# Patient Record
Sex: FEMALE | Race: WHITE | Employment: STUDENT | URBAN - NONMETROPOLITAN AREA
[De-identification: names, ages, dates, MRNs, and addresses within clinical notes are randomized per-mention and may not be internally consistent; named-entity substitution may affect disease eponyms.]

---
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
## 2020-01-27 ENCOUNTER — MYC MEDICAL ADVICE (OUTPATIENT)
Dept: FAMILY MEDICINE | Facility: CLINIC | Age: 20
End: 2020-01-27

## 2020-01-27 DIAGNOSIS — N93.8 DUB (DYSFUNCTIONAL UTERINE BLEEDING): Primary | ICD-10-CM

## 2020-01-28 RX ORDER — ETONOGESTREL AND ETHINYL ESTRADIOL VAGINAL RING .015; .12 MG/D; MG/D
1 RING VAGINAL
Qty: 3 EACH | Refills: 3 | Status: SHIPPED | OUTPATIENT
Start: 2020-01-28 | End: 2020-05-04

## 2020-05-04 ENCOUNTER — MYC REFILL (OUTPATIENT)
Dept: FAMILY MEDICINE | Facility: CLINIC | Age: 20
End: 2020-05-04

## 2020-05-04 DIAGNOSIS — N93.8 DUB (DYSFUNCTIONAL UTERINE BLEEDING): ICD-10-CM

## 2020-05-04 RX ORDER — ETONOGESTREL AND ETHINYL ESTRADIOL VAGINAL RING .015; .12 MG/D; MG/D
1 RING VAGINAL
Qty: 3 EACH | Refills: 0 | Status: SHIPPED | OUTPATIENT
Start: 2020-05-04 | End: 2020-10-14

## 2020-05-04 RX ORDER — ETONOGESTREL AND ETHINYL ESTRADIOL VAGINAL RING .015; .12 MG/D; MG/D
1 RING VAGINAL
Qty: 3 EACH | Refills: 0 | Status: SHIPPED | OUTPATIENT
Start: 2020-05-04 | End: 2020-05-04

## 2020-07-10 ENCOUNTER — MYC REFILL (OUTPATIENT)
Dept: FAMILY MEDICINE | Facility: CLINIC | Age: 20
End: 2020-07-10

## 2020-07-10 DIAGNOSIS — N93.8 DUB (DYSFUNCTIONAL UTERINE BLEEDING): ICD-10-CM

## 2020-07-10 RX ORDER — ETONOGESTREL AND ETHINYL ESTRADIOL VAGINAL RING .015; .12 MG/D; MG/D
1 RING VAGINAL
Qty: 3 EACH | Refills: 0 | OUTPATIENT
Start: 2020-07-10

## 2020-10-12 ENCOUNTER — MYC REFILL (OUTPATIENT)
Dept: FAMILY MEDICINE | Facility: CLINIC | Age: 20
End: 2020-10-12

## 2020-10-12 DIAGNOSIS — N93.8 DUB (DYSFUNCTIONAL UTERINE BLEEDING): ICD-10-CM

## 2020-10-12 DIAGNOSIS — G43.109 MIGRAINE WITH AURA AND WITHOUT STATUS MIGRAINOSUS, NOT INTRACTABLE: ICD-10-CM

## 2020-10-12 RX ORDER — SUMATRIPTAN 20 MG/1
1 SPRAY NASAL PRN
Qty: 6 EACH | Refills: 5 | Status: CANCELLED | OUTPATIENT
Start: 2020-10-12

## 2020-10-12 RX ORDER — ETONOGESTREL AND ETHINYL ESTRADIOL VAGINAL RING .015; .12 MG/D; MG/D
1 RING VAGINAL
Qty: 3 EACH | Refills: 0 | Status: CANCELLED | OUTPATIENT
Start: 2020-10-12

## 2020-10-13 NOTE — TELEPHONE ENCOUNTER
"Requested Prescriptions   Pending Prescriptions Disp Refills     etonogestrel-ethinyl estradiol (NUVARING) 0.12-0.015 MG/24HR vaginal ring 3 each 0     Sig: Place 1 each vaginally every 28 days       Contraceptives Protocol Failed - 10/12/2020 10:32 AM        Failed - Recent (12 mo) or future (30 days) visit within the authorizing provider's specialty     Patient has had an office visit with the authorizing provider or a provider within the authorizing providers department within the previous 12 mos or has a future within next 30 days. See \"Patient Info\" tab in inbasket, or \"Choose Columns\" in Meds & Orders section of the refill encounter.              Passed - Patient is not a current smoker if age is 35 or older        Passed - Medication is active on med list        Passed - No active pregnancy on record        Passed - No positive pregnancy test in past 12 months             "

## 2020-10-13 NOTE — TELEPHONE ENCOUNTER
"Requested Prescriptions   Pending Prescriptions Disp Refills     SUMAtriptan (IMITREX) 20 MG/ACT nasal spray 6 each 5     Sig: Spray 1 spray in nostril as needed for migraine May repeat in 2 hours. Max 2 sprays/24 hours.       Serotonin Agonists Failed - 10/12/2020 10:38 AM        Failed - Blood pressure under 140/90 in past 12 months     BP Readings from Last 3 Encounters:   08/07/19 124/67   07/10/19 110/62   02/13/18 146/82 (>99 %, Z >2.33 /  93 %, Z = 1.48)*     *BP percentiles are based on the 2017 AAP Clinical Practice Guideline for girls                 Failed - Serotonin Agonist request needs review.     Please review patient's record. If patient has had 8 or more treatments in the past month, please forward to provider.          Failed - Recent (12 mo) or future (30 days) visit within the authorizing provider's specialty     Patient has had an office visit with the authorizing provider or a provider within the authorizing providers department within the previous 12 mos or has a future within next 30 days. See \"Patient Info\" tab in inbasket, or \"Choose Columns\" in Meds & Orders section of the refill encounter.              Passed - Medication is active on med list        Passed - Patient is age 18 or older        Passed - No active pregnancy on record        Passed - No positive pregnancy test in past 12 months             "

## 2020-10-14 ENCOUNTER — MYC MEDICAL ADVICE (OUTPATIENT)
Dept: FAMILY MEDICINE | Facility: CLINIC | Age: 20
End: 2020-10-14

## 2020-10-14 DIAGNOSIS — N93.8 DUB (DYSFUNCTIONAL UTERINE BLEEDING): ICD-10-CM

## 2020-10-14 RX ORDER — ETONOGESTREL AND ETHINYL ESTRADIOL VAGINAL RING .015; .12 MG/D; MG/D
1 RING VAGINAL
Qty: 3 EACH | Refills: 0 | Status: SHIPPED | OUTPATIENT
Start: 2020-10-14 | End: 2020-11-03

## 2020-10-14 NOTE — TELEPHONE ENCOUNTER
Call pt to set up virtuyal visit with ulises to get refills of migrain meds and ocp   May refill x1 to get to appt

## 2020-10-28 ENCOUNTER — HOSPITAL ENCOUNTER (OUTPATIENT)
Facility: CLINIC | Age: 20
Setting detail: OBSERVATION
Discharge: HOME | End: 2020-10-29
Attending: HOSPITALIST | Admitting: INTERNAL MEDICINE
Payer: COMMERCIAL

## 2020-10-28 ENCOUNTER — HOSPITAL ENCOUNTER (EMERGENCY)
Facility: CLINIC | Age: 20
Discharge: ANOTHER HOSPITAL | End: 2020-10-28
Attending: EMERGENCY MEDICINE | Admitting: EMERGENCY MEDICINE
Payer: COMMERCIAL

## 2020-10-28 ENCOUNTER — APPOINTMENT (OUTPATIENT)
Dept: GENERAL RADIOLOGY | Facility: CLINIC | Age: 20
End: 2020-10-28
Attending: EMERGENCY MEDICINE
Payer: COMMERCIAL

## 2020-10-28 VITALS
DIASTOLIC BLOOD PRESSURE: 69 MMHG | BODY MASS INDEX: 24.73 KG/M2 | HEART RATE: 76 BPM | OXYGEN SATURATION: 97 % | SYSTOLIC BLOOD PRESSURE: 124 MMHG | WEIGHT: 190 LBS | RESPIRATION RATE: 19 BRPM | TEMPERATURE: 97 F

## 2020-10-28 DIAGNOSIS — R79.89 ELEVATED TROPONIN: ICD-10-CM

## 2020-10-28 DIAGNOSIS — I40.0 SUBACUTE VIRAL MYOCARDITIS: Primary | ICD-10-CM

## 2020-10-28 PROBLEM — I51.4 MYOCARDITIS (H): Status: ACTIVE | Noted: 2020-10-28

## 2020-10-28 LAB
ANION GAP SERPL CALCULATED.3IONS-SCNC: 7 MMOL/L (ref 3–14)
BASOPHILS # BLD AUTO: 0 10E9/L (ref 0–0.2)
BASOPHILS NFR BLD AUTO: 0.5 %
BUN SERPL-MCNC: 10 MG/DL (ref 7–30)
CALCIUM SERPL-MCNC: 9.7 MG/DL (ref 8.5–10.1)
CHLORIDE SERPL-SCNC: 109 MMOL/L (ref 96–110)
CO2 SERPL-SCNC: 24 MMOL/L (ref 20–32)
CREAT SERPL-MCNC: 0.81 MG/DL (ref 0.5–1)
CRP SERPL-MCNC: 3.5 MG/L (ref 0–8)
D DIMER PPP FEU-MCNC: 0.4 UG/ML FEU (ref 0–0.5)
DIFFERENTIAL METHOD BLD: NORMAL
EOSINOPHIL # BLD AUTO: 0.2 10E9/L (ref 0–0.7)
EOSINOPHIL NFR BLD AUTO: 3.1 %
ERYTHROCYTE [DISTWIDTH] IN BLOOD BY AUTOMATED COUNT: 12.8 % (ref 10–15)
ERYTHROCYTE [SEDIMENTATION RATE] IN BLOOD BY WESTERGREN METHOD: 6 MM/H (ref 0–20)
GFR SERPL CREATININE-BSD FRML MDRD: >90 ML/MIN/{1.73_M2}
GLUCOSE SERPL-MCNC: 81 MG/DL (ref 70–99)
HCG UR QL: NEGATIVE
HCT VFR BLD AUTO: 44.2 % (ref 35–47)
HGB BLD-MCNC: 14.9 G/DL (ref 11.7–15.7)
IMM GRANULOCYTES # BLD: 0 10E9/L (ref 0–0.4)
IMM GRANULOCYTES NFR BLD: 0.2 %
LYMPHOCYTES # BLD AUTO: 1.7 10E9/L (ref 0.8–5.3)
LYMPHOCYTES NFR BLD AUTO: 28.7 %
MCH RBC QN AUTO: 30.1 PG (ref 26.5–33)
MCHC RBC AUTO-ENTMCNC: 33.7 G/DL (ref 31.5–36.5)
MCV RBC AUTO: 89 FL (ref 78–100)
MONOCYTES # BLD AUTO: 0.4 10E9/L (ref 0–1.3)
MONOCYTES NFR BLD AUTO: 7.7 %
NEUTROPHILS # BLD AUTO: 3.4 10E9/L (ref 1.6–8.3)
NEUTROPHILS NFR BLD AUTO: 59.8 %
NRBC # BLD AUTO: 0 10*3/UL
NRBC BLD AUTO-RTO: 0 /100
NT-PROBNP SERPL-MCNC: 48 PG/ML (ref 0–450)
PLATELET # BLD AUTO: 247 10E9/L (ref 150–450)
POTASSIUM SERPL-SCNC: 3.7 MMOL/L (ref 3.4–5.3)
RBC # BLD AUTO: 4.95 10E12/L (ref 3.8–5.2)
SARS-COV-2 RNA SPEC QL NAA+PROBE: NORMAL
SODIUM SERPL-SCNC: 140 MMOL/L (ref 133–144)
SPECIMEN SOURCE: NORMAL
TROPONIN I SERPL-MCNC: 0.06 UG/L (ref 0–0.04)
TROPONIN I SERPL-MCNC: 0.07 UG/L (ref 0–0.04)
TSH SERPL DL<=0.005 MIU/L-ACNC: 2.39 MU/L (ref 0.4–4)
WBC # BLD AUTO: 5.7 10E9/L (ref 4–11)

## 2020-10-28 PROCEDURE — 84443 ASSAY THYROID STIM HORMONE: CPT | Performed by: EMERGENCY MEDICINE

## 2020-10-28 PROCEDURE — 86140 C-REACTIVE PROTEIN: CPT | Performed by: EMERGENCY MEDICINE

## 2020-10-28 PROCEDURE — 93308 TTE F-UP OR LMTD: CPT | Performed by: EMERGENCY MEDICINE

## 2020-10-28 PROCEDURE — 71045 X-RAY EXAM CHEST 1 VIEW: CPT

## 2020-10-28 PROCEDURE — C9803 HOPD COVID-19 SPEC COLLECT: HCPCS | Performed by: EMERGENCY MEDICINE

## 2020-10-28 PROCEDURE — 84484 ASSAY OF TROPONIN QUANT: CPT | Performed by: EMERGENCY MEDICINE

## 2020-10-28 PROCEDURE — 99220 PR INITIAL OBSERVATION CARE,LEVEL III: CPT | Performed by: INTERNAL MEDICINE

## 2020-10-28 PROCEDURE — 99285 EMERGENCY DEPT VISIT HI MDM: CPT | Mod: 25 | Performed by: EMERGENCY MEDICINE

## 2020-10-28 PROCEDURE — 93010 ELECTROCARDIOGRAM REPORT: CPT | Performed by: EMERGENCY MEDICINE

## 2020-10-28 PROCEDURE — 93005 ELECTROCARDIOGRAM TRACING: CPT | Performed by: EMERGENCY MEDICINE

## 2020-10-28 PROCEDURE — 81025 URINE PREGNANCY TEST: CPT | Performed by: EMERGENCY MEDICINE

## 2020-10-28 PROCEDURE — 83880 ASSAY OF NATRIURETIC PEPTIDE: CPT | Performed by: EMERGENCY MEDICINE

## 2020-10-28 PROCEDURE — 85025 COMPLETE CBC W/AUTO DIFF WBC: CPT | Performed by: EMERGENCY MEDICINE

## 2020-10-28 PROCEDURE — 250N000013 HC RX MED GY IP 250 OP 250 PS 637: Performed by: EMERGENCY MEDICINE

## 2020-10-28 PROCEDURE — U0003 INFECTIOUS AGENT DETECTION BY NUCLEIC ACID (DNA OR RNA); SEVERE ACUTE RESPIRATORY SYNDROME CORONAVIRUS 2 (SARS-COV-2) (CORONAVIRUS DISEASE [COVID-19]), AMPLIFIED PROBE TECHNIQUE, MAKING USE OF HIGH THROUGHPUT TECHNOLOGIES AS DESCRIBED BY CMS-2020-01-R: HCPCS | Performed by: EMERGENCY MEDICINE

## 2020-10-28 PROCEDURE — 93308 TTE F-UP OR LMTD: CPT | Mod: 26 | Performed by: EMERGENCY MEDICINE

## 2020-10-28 PROCEDURE — 85652 RBC SED RATE AUTOMATED: CPT | Performed by: EMERGENCY MEDICINE

## 2020-10-28 PROCEDURE — 80048 BASIC METABOLIC PNL TOTAL CA: CPT | Performed by: EMERGENCY MEDICINE

## 2020-10-28 PROCEDURE — 85379 FIBRIN DEGRADATION QUANT: CPT | Performed by: EMERGENCY MEDICINE

## 2020-10-28 RX ORDER — ASPIRIN 81 MG/1
324 TABLET, CHEWABLE ORAL ONCE
Status: COMPLETED | OUTPATIENT
Start: 2020-10-28 | End: 2020-10-28

## 2020-10-28 RX ADMIN — ASPIRIN 81 MG 324 MG: 81 TABLET ORAL at 18:40

## 2020-10-28 ASSESSMENT — MIFFLIN-ST. JEOR: SCORE: 1780.58

## 2020-10-28 NOTE — ED PROVIDER NOTES
History     Chief Complaint   Patient presents with     Chest Pain     woke with chest pressure     HPI  Ricardo Antunez is a 19 year old female who presents for chest pain.  The patient says that she has had some mild left-sided chest pain over the past 1.5 weeks, described as mild pressure.  Then about 12 hours prior to her arrival here she was laying in bed when she developed increased pressure that radiated to both sides of her chest.  She felt somewhat diaphoretic and short of breath with this, lasted for about 15 minutes and then it resolved.  She has not taking medicine for this.  She still has some mild pressure for the left side of her chest.  She denies any fevers, chills, headache, shortness of breath, cough, hemoptysis, runny nose, sore throat, ear pain, abdominal pain, vomiting, diarrhea, or rash.  No history of blood clots.  She denies any pain swelling of the lower extremities or recent immobilization.  She is not on oral birth control.  No personal family history of blood clots.  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    Allergies:  Allergies   Allergen Reactions     Cats        Problem List:    Patient Active Problem List    Diagnosis Date Noted     Migraine with aura 12/01/2013     Priority: Medium     Acute suppurative otitis media without spontaneous rupture of ear drum 03/22/2006     Priority: Medium     Problem list name updated by automated process. Provider to review          Past Medical History:    No past medical history on file.    Past Surgical History:    No past surgical history on file.    Family History:    Family History   Problem Relation Age of Onset     Cancer Maternal Grandfather         leukemia       Social History:  Marital Status:  Single [1]  Social History     Tobacco Use     Smoking status: Never Smoker     Smokeless tobacco: Never Used   Substance Use Topics     Alcohol use: No     Drug use: No         Medications:         acetaminophen (TYLENOL) 325 MG tablet       etonogestrel-ethinyl estradiol (NUVARING) 0.12-0.015 MG/24HR vaginal ring       etonogestrel-ethinyl estradiol (NUVARING) 0.12-0.015 MG/24HR vaginal ring       ondansetron (ZOFRAN) 4 MG tablet       SUMAtriptan (IMITREX) 20 MG/ACT nasal spray       SUMAtriptan (IMITREX) 50 MG tablet          Review of Systems  Pertinent positives and negatives listed in the HPI, all other systems reviewed and are negative.    Physical Exam   BP: (!) 153/89  Pulse: 56  Temp: 97  F (36.1  C)  Resp: 16  Weight: 86.2 kg (190 lb)  SpO2: 97 %      Physical Exam  Vitals signs and nursing note reviewed.   Constitutional:       General: She is in acute distress.      Appearance: She is well-developed. She is not diaphoretic.   HENT:      Head: Normocephalic and atraumatic.      Right Ear: External ear normal.      Left Ear: External ear normal.      Nose: Nose normal.   Eyes:      General: No scleral icterus.     Conjunctiva/sclera: Conjunctivae normal.   Neck:      Musculoskeletal: Normal range of motion.   Cardiovascular:      Rate and Rhythm: Normal rate and regular rhythm.   Pulmonary:      Effort: Pulmonary effort is normal. No respiratory distress.      Breath sounds: No stridor.   Abdominal:      General: There is no distension.      Palpations: Abdomen is soft.      Tenderness: There is no abdominal tenderness. There is no guarding or rebound.   Musculoskeletal:      Right lower leg: No edema.      Left lower leg: No edema.   Skin:     General: Skin is warm and dry.   Neurological:      Mental Status: She is alert and oriented to person, place, and time.   Psychiatric:         Behavior: Behavior normal.         ED Course        Procedures    Results for orders placed during the hospital encounter of 10/28/20   POC US ECHO LIMITED    [facility] Procedure Note      Limited Bedside ED Cardiac Ultrasound:    PROCEDURE: PERFORMED BY: Dr. Andre Gould  MD Jewels  INDICATIONS/SYMPTOM:  Chest Pain  PROBE: Cardiac phased array probe  BODY LOCATION: Chest  FINDINGS:   The ultrasound was performed utilizing the parasternal long axis, parasternal short axis and apical 4 chamber views.  Cardiac contractility:  Present  Gross estimation of cardiac kinesis: normal  Pericardial Effusion:  None  RV:LV ratio: LV > RV  INTERPRETATION:    Chamber size and motion were grossly normal with LV > RV, normal cardiac kinesis.  No pericardial effusion was found.  IMAGE DOCUMENTATION: Images were archived to PACs system.                  EKG Interpretation:      Interpreted by Andre Donis MD  Time reviewed: 1622  Symptoms at time of EKG: Chest pain   Rhythm: normal sinus   Rate: normal  Axis: normal  Ectopy: none  Conduction: normal  ST Segments/ T Waves: No ST-T wave changes  Q Waves: none  Comparison to prior: No old EKG available    Clinical Impression: normal EKG    Critical Care time:  none               Results for orders placed or performed during the hospital encounter of 10/28/20 (from the past 24 hour(s))   HCG qualitative urine   Result Value Ref Range    HCG Qual Urine Negative NEG^Negative   Basic metabolic panel   Result Value Ref Range    Sodium 140 133 - 144 mmol/L    Potassium 3.7 3.4 - 5.3 mmol/L    Chloride 109 96 - 110 mmol/L    Carbon Dioxide 24 20 - 32 mmol/L    Anion Gap 7 3 - 14 mmol/L    Glucose 81 70 - 99 mg/dL    Urea Nitrogen 10 7 - 30 mg/dL    Creatinine 0.81 0.50 - 1.00 mg/dL    GFR Estimate >90 >60 mL/min/[1.73_m2]    GFR Estimate If Black >90 >60 mL/min/[1.73_m2]    Calcium 9.7 8.5 - 10.1 mg/dL   CBC with platelets differential   Result Value Ref Range    WBC 5.7 4.0 - 11.0 10e9/L    RBC Count 4.95 3.8 - 5.2 10e12/L    Hemoglobin 14.9 11.7 - 15.7 g/dL    Hematocrit 44.2 35.0 - 47.0 %    MCV 89 78 - 100 fl    MCH 30.1 26.5 - 33.0 pg    MCHC 33.7 31.5 - 36.5 g/dL    RDW 12.8 10.0 - 15.0 %    Platelet Count 247 150 - 450 10e9/L    Diff Method  Automated Method     % Neutrophils 59.8 %    % Lymphocytes 28.7 %    % Monocytes 7.7 %    % Eosinophils 3.1 %    % Basophils 0.5 %    % Immature Granulocytes 0.2 %    Nucleated RBCs 0 0 /100    Absolute Neutrophil 3.4 1.6 - 8.3 10e9/L    Absolute Lymphocytes 1.7 0.8 - 5.3 10e9/L    Absolute Monocytes 0.4 0.0 - 1.3 10e9/L    Absolute Eosinophils 0.2 0.0 - 0.7 10e9/L    Absolute Basophils 0.0 0.0 - 0.2 10e9/L    Abs Immature Granulocytes 0.0 0 - 0.4 10e9/L    Absolute Nucleated RBC 0.0    Troponin I   Result Value Ref Range    Troponin I ES 0.063 (H) 0.000 - 0.045 ug/L   TSH with free T4 reflex   Result Value Ref Range    TSH 2.39 0.40 - 4.00 mU/L   D dimer quantitative   Result Value Ref Range    D Dimer 0.4 0.0 - 0.50 ug/ml FEU   Erythrocyte sedimentation rate auto   Result Value Ref Range    Sed Rate 6 0 - 20 mm/h   CRP inflammation   Result Value Ref Range    CRP Inflammation 3.5 0.0 - 8.0 mg/L   Nt probnp inpatient   Result Value Ref Range    N-Terminal Pro BNP Inpatient 48 0 - 450 pg/mL   POC US ECHO LIMITED    Impression    [facility] Procedure Note      Limited Bedside ED Cardiac Ultrasound:    PROCEDURE: PERFORMED BY: Dr. Andre Donis MD  INDICATIONS/SYMPTOM:  Chest Pain  PROBE: Cardiac phased array probe  BODY LOCATION: Chest  FINDINGS:   The ultrasound was performed utilizing the parasternal long axis, parasternal short axis and apical 4 chamber views.  Cardiac contractility:  Present  Gross estimation of cardiac kinesis: normal  Pericardial Effusion:  None  RV:LV ratio: LV > RV  INTERPRETATION:    Chamber size and motion were grossly normal with LV > RV, normal cardiac kinesis.  No pericardial effusion was found.  IMAGE DOCUMENTATION: Images were archived to PACs system.        XR Chest Port 1 View    Narrative    CHEST PORTABLE ONE VIEW   10/28/2020 5:50 PM     HISTORY: Chest pain.    COMPARISON: None available.      Impression    IMPRESSION: Single portable AP view of the chest was  obtained.  Cardiomediastinal silhouette is within normal limits. No suspicious  focal pulmonary opacities. No significant pleural effusion or  pneumothorax.    LUIS MIGUEL WILKERSON MD       Medications   aspirin (ASA) chewable tablet 324 mg (324 mg Oral Given 10/28/20 1840)       Assessments & Plan (with Medical Decision Making)   19-year-old female who presents for chest pain.  Blood pressure is 129/75, temperature is 97  F, heart 58, SPO2 is 100% on room air.  Urine pregnancy test is negative.  I think that this patient is very low risk for pulmonary embolism and no further work-up is pursued.  EKG is sinus rhythm without signs of ischemia or dysrhythmia.  Troponin is mildly elevated at 0.063 at bedside ultrasound shows good cardiac function with no pericardial effusion, no dilated right ventricle, no signs of hypertrophic cardiomyopathy.  She is given aspirin.    I discussed the case with Dr. Ji, the on-call cardiologist at [facility].  He recommended testing to D-dimer, ESR, and CRP and recommended transferring her to [facility] for close monitoring.    We discussed heparin and he said if she was pain-free and her troponin was not rising he would hold off on heparin for now.    I discussed the case with the on-call hospitalist at [facility], Dr. Stearns, who agrees to accept the patient to his service.  D-dimer is normal, unlikely pulmonary embolism.  ESR and CRP are normal which is reassuring.  Hemoglobin is normal, no signs of anemia.  Electrolytes are within normal limits.  Unclear cause of the patient's elevated troponin.  A repeat troponin is now pending and the patient will be transferred to [facility] for further treatment.  The patient is in agreement with this plan.    I have reviewed the nursing notes.    I have reviewed the findings, diagnosis, plan and need for follow up with the patient.       New Prescriptions    No medications on file       Final diagnoses:   Elevated troponin        10/28/2020   [facility] EMERGENCY DEPT     Andre Donis MD  10/28/20 [id number]

## 2020-10-28 NOTE — LETTER
[facility] [address] [facility]  [address] [place] [address]  Phone: [phone]    October 29, 2020        Ricardo Antunez  [address]   [address]  [place] [address]      To whom it may concern:    RE: Ricardo Silva was hospitalized from 10/28 to 10/29 and may return to school on Monday, 11/2. She will have medical follow up appointments in the upcoming weeks.       Sincerely,        Melissa Cho

## 2020-10-28 NOTE — ED NOTES
"Pt presents to ED with c/o chest pain. Chest pain described as \"discomfort\" and \"pressure\" Symptoms onset was ~1 week ago. Last night woke up @ 3 am and CP associated with lightheadedness and dizziness. Denies N/V/D. Denies SOB.   "

## 2020-10-29 ENCOUNTER — APPOINTMENT (OUTPATIENT)
Dept: CARDIOLOGY | Facility: CLINIC | Age: 20
End: 2020-10-29
Attending: INTERNAL MEDICINE
Payer: COMMERCIAL

## 2020-10-29 VITALS
SYSTOLIC BLOOD PRESSURE: 116 MMHG | TEMPERATURE: 98.1 F | RESPIRATION RATE: 20 BRPM | BODY MASS INDEX: 25.73 KG/M2 | HEIGHT: 72 IN | WEIGHT: 190 LBS | OXYGEN SATURATION: 97 % | DIASTOLIC BLOOD PRESSURE: 72 MMHG | HEART RATE: 82 BPM

## 2020-10-29 LAB
CHOLEST SERPL-MCNC: 214 MG/DL
HDLC SERPL-MCNC: 68 MG/DL
LABORATORY COMMENT REPORT: NORMAL
LDLC SERPL CALC-MCNC: 116 MG/DL
NONHDLC SERPL-MCNC: 146 MG/DL
SARS-COV-2 RNA SPEC QL NAA+PROBE: NEGATIVE
SPECIMEN SOURCE: NORMAL
TRIGL SERPL-MCNC: 149 MG/DL
TROPONIN I SERPL-MCNC: 0.06 UG/L (ref 0–0.04)
TROPONIN I SERPL-MCNC: 0.07 UG/L (ref 0–0.04)

## 2020-10-29 PROCEDURE — 250N000013 HC RX MED GY IP 250 OP 250 PS 637: Performed by: INTERNAL MEDICINE

## 2020-10-29 PROCEDURE — 99204 OFFICE O/P NEW MOD 45 MIN: CPT | Mod: 25 | Performed by: INTERNAL MEDICINE

## 2020-10-29 PROCEDURE — 93306 TTE W/DOPPLER COMPLETE: CPT | Mod: 26 | Performed by: INTERNAL MEDICINE

## 2020-10-29 PROCEDURE — 93306 TTE W/DOPPLER COMPLETE: CPT

## 2020-10-29 PROCEDURE — 99217 PR OBSERVATION CARE DISCHARGE: CPT | Performed by: INTERNAL MEDICINE

## 2020-10-29 PROCEDURE — 84484 ASSAY OF TROPONIN QUANT: CPT | Performed by: INTERNAL MEDICINE

## 2020-10-29 PROCEDURE — 36415 COLL VENOUS BLD VENIPUNCTURE: CPT | Performed by: INTERNAL MEDICINE

## 2020-10-29 PROCEDURE — 80061 LIPID PANEL: CPT | Performed by: INTERNAL MEDICINE

## 2020-10-29 PROCEDURE — G0378 HOSPITAL OBSERVATION PER HR: HCPCS

## 2020-10-29 PROCEDURE — 99207 PR CDG-CODE CATEGORY CHANGED: CPT | Performed by: INTERNAL MEDICINE

## 2020-10-29 RX ORDER — COLCHICINE 0.6 MG/1
0.6 TABLET ORAL 2 TIMES DAILY
Qty: 60 TABLET | Refills: 0 | Status: SHIPPED | OUTPATIENT
Start: 2020-10-29 | End: 2020-11-30

## 2020-10-29 RX ORDER — COLCHICINE 0.6 MG/1
0.6 TABLET ORAL 2 TIMES DAILY
Status: DISCONTINUED | OUTPATIENT
Start: 2020-10-29 | End: 2020-10-29 | Stop reason: HOSPADM

## 2020-10-29 RX ORDER — AMOXICILLIN 250 MG
1 CAPSULE ORAL 2 TIMES DAILY PRN
Status: DISCONTINUED | OUTPATIENT
Start: 2020-10-29 | End: 2020-10-29 | Stop reason: HOSPADM

## 2020-10-29 RX ORDER — ACETAMINOPHEN 325 MG/1
650 TABLET ORAL EVERY 4 HOURS PRN
Status: DISCONTINUED | OUTPATIENT
Start: 2020-10-29 | End: 2020-10-29 | Stop reason: HOSPADM

## 2020-10-29 RX ORDER — OXYCODONE HYDROCHLORIDE 5 MG/1
5-10 TABLET ORAL
Status: DISCONTINUED | OUTPATIENT
Start: 2020-10-29 | End: 2020-10-29 | Stop reason: HOSPADM

## 2020-10-29 RX ORDER — LANOLIN ALCOHOL/MO/W.PET/CERES
3 CREAM (GRAM) TOPICAL
Status: DISCONTINUED | OUTPATIENT
Start: 2020-10-29 | End: 2020-10-29 | Stop reason: HOSPADM

## 2020-10-29 RX ORDER — PROCHLORPERAZINE 25 MG
25 SUPPOSITORY, RECTAL RECTAL EVERY 12 HOURS PRN
Status: DISCONTINUED | OUTPATIENT
Start: 2020-10-29 | End: 2020-10-29 | Stop reason: HOSPADM

## 2020-10-29 RX ORDER — ONDANSETRON 4 MG/1
4 TABLET, ORALLY DISINTEGRATING ORAL EVERY 6 HOURS PRN
Status: DISCONTINUED | OUTPATIENT
Start: 2020-10-29 | End: 2020-10-29 | Stop reason: HOSPADM

## 2020-10-29 RX ORDER — POLYETHYLENE GLYCOL 3350 17 G/17G
17 POWDER, FOR SOLUTION ORAL DAILY PRN
Status: DISCONTINUED | OUTPATIENT
Start: 2020-10-29 | End: 2020-10-29 | Stop reason: HOSPADM

## 2020-10-29 RX ORDER — ACETAMINOPHEN 650 MG/1
650 SUPPOSITORY RECTAL EVERY 4 HOURS PRN
Status: DISCONTINUED | OUTPATIENT
Start: 2020-10-29 | End: 2020-10-29 | Stop reason: HOSPADM

## 2020-10-29 RX ORDER — LIDOCAINE 40 MG/G
CREAM TOPICAL
Status: DISCONTINUED | OUTPATIENT
Start: 2020-10-29 | End: 2020-10-29 | Stop reason: HOSPADM

## 2020-10-29 RX ORDER — ONDANSETRON 2 MG/ML
4 INJECTION INTRAMUSCULAR; INTRAVENOUS EVERY 6 HOURS PRN
Status: DISCONTINUED | OUTPATIENT
Start: 2020-10-29 | End: 2020-10-29 | Stop reason: HOSPADM

## 2020-10-29 RX ORDER — BISACODYL 10 MG
10 SUPPOSITORY, RECTAL RECTAL DAILY PRN
Status: DISCONTINUED | OUTPATIENT
Start: 2020-10-29 | End: 2020-10-29 | Stop reason: HOSPADM

## 2020-10-29 RX ORDER — PROCHLORPERAZINE MALEATE 5 MG
10 TABLET ORAL EVERY 6 HOURS PRN
Status: DISCONTINUED | OUTPATIENT
Start: 2020-10-29 | End: 2020-10-29 | Stop reason: HOSPADM

## 2020-10-29 RX ORDER — NALOXONE HYDROCHLORIDE 0.4 MG/ML
.1-.4 INJECTION, SOLUTION INTRAMUSCULAR; INTRAVENOUS; SUBCUTANEOUS
Status: DISCONTINUED | OUTPATIENT
Start: 2020-10-29 | End: 2020-10-29 | Stop reason: HOSPADM

## 2020-10-29 RX ORDER — AMOXICILLIN 250 MG
2 CAPSULE ORAL 2 TIMES DAILY PRN
Status: DISCONTINUED | OUTPATIENT
Start: 2020-10-29 | End: 2020-10-29 | Stop reason: HOSPADM

## 2020-10-29 RX ADMIN — COLCHICINE 0.6 MG: 0.6 TABLET, FILM COATED ORAL at 18:18

## 2020-10-29 NOTE — CONSULTS
CARDIOLOGY CONSULT    REASON FOR CONSULT: myopericarditis    PRIMARY CARE PHYSICIAN:  Nupur Sanchez    HISTORY OF PRESENT ILLNESS:  Ms. Antunez is a 19 year old woman without significant PMH who presents for the evaluation of chest pain.   Ricardo states that two weeks ago she noted anterior chest discomfort.  This was fairly persistent over two weeks.  She does not notice any change with inspiration or change with change with position.  She had been in contact with a friend who tested positive for COVID 19 and had noted a mild cough several days after seeing her.  No other fevers/chills/shortness of breath. She plays volleyball for [employer] and noted some light-headedness the other day at practice.   As her symptoms persisted she went to the ED and was noted to have a modestly elevated troponin.  She was transferred to [facility].  Echocardiogram demonstrated normal LV function, small pericardial effusion noted along with RV free wall.  CRP normal.    She is currently chest pain free.  She denies any exertional chest pain, chest discomfort or shortness of breath.   She is otherwise without complaints.      PAST MEDICAL HISTORY:  No significant PMH      MEDICATIONS:  Current Facility-Administered Medications   Medication     acetaminophen (TYLENOL) Suppository 650 mg     acetaminophen (TYLENOL) tablet 650 mg     bisacodyl (DULCOLAX) Suppository 10 mg     lidocaine (LMX4) cream     lidocaine 1 % 0.1-1 mL     melatonin tablet 3 mg     naloxone (NARCAN) injection 0.1-0.4 mg     ondansetron (ZOFRAN-ODT) ODT tab 4 mg    Or     ondansetron (ZOFRAN) injection 4 mg     oxyCODONE (ROXICODONE) tablet 5-10 mg     polyethylene glycol (MIRALAX) Packet 17 g     prochlorperazine (COMPAZINE) injection 10 mg    Or     prochlorperazine (COMPAZINE) tablet 10 mg    Or     prochlorperazine (COMPAZINE) suppository 25 mg     senna-docusate (SENOKOT-S/PERICOLACE) 8.6-50 MG per tablet 1 tablet    Or     senna-docusate  (SENOKOT-S/PERICOLACE) 8.6-50 MG per tablet 2 tablet     sodium chloride (PF) 0.9% PF flush 3 mL     sodium chloride (PF) 0.9% PF flush 3 mL       ALLERGIES:  Allergies   Allergen Reactions     Cats        SOCIAL HISTORY:  I have reviewed this patient's social history and updated it with pertinent information if needed. Ricardo Antunez  reports that she has never smoked. She has never used smokeless tobacco. She reports that she does not drink alcohol or use drugs.    FAMILY HISTORY:  I have reviewed this patient's family history and updated it with pertinent information if needed.   Family History   Problem Relation Age of Onset     Cancer Maternal Grandfather         leukemia       REVIEW OF SYSTEMS:  A complete ROS was obtained and the pertinent positives are outlined in the history of present illness above.  The remainder of systems is negative.      PHYSICAL EXAM:  Temp: 98.1  F (36.7  C) Temp src: Oral BP: 115/71 Pulse: 77   Resp: 20 SpO2: 98 % O2 Device: None (Room air)    Vital Signs with Ranges  Temp:  [97  F (36.1  C)-98.9  F (37.2  C)] 98.1  F (36.7  C)  Pulse:  [56-92] 77  Resp:  [14-30] 20  BP: (115-153)/(68-89) 115/71  SpO2:  [97 %-100 %] 98 %  190 lbs 0 oz    Constitutional: awake, alert, no distress  Eyes: PERRL, sclera nonicteric  ENT: trachea midline  Respiratory: CTAB  Cardiovascular: RRR no m/r/g, no JVD  GI: nondistended, nontender, bowel sounds present  Lymph/Hematologic: no lymphadenopathy  Skin: dry, no rash  Musculoskeletal: good muscle tone, no edema bilaterally  Neurologic: no focal deficits  Neuropsychiatric: appropriate affact    DATA:  Labs:    Reviewed in Epic.   Troponins 0.065-0.066-0.063    ECG:  Dated 10/28/2020 reviewed personally.  Normal sinus rhythm with nonspecific ST segment changes    Echocardiogram:  Interpretation Summary  1. Left ventricular systolic function is normal. The visual ejection fraction  is estimated at 60-65%.  2. No regional wall motion abnormalities  noted.  3. The right ventricle is normal in structure, function and size.  4. No evidence for significant valvular pathology.  5. Small pericardial effusion adjacent to RV free wall. No tamponade  physiology.        ASSESSMENT:  1.  Probable myopericarditis:  Modestly elevated troponin, flat.  Chest pain now resolved.    2.  Small pericardial effusion:  Secondary to #1  3.  COVID exposure:  Negative COVID test x2    RECOMMENDATIONS:  1. Reviewed results of her echocardiogram, lab findings with Ricardo and her mother (over the phone).  Presentation consistent with myopericarditis.  COVID tests have been negative.  She is currently chest pain free.  Plan as follows:  -Cardiac MRI (either inpatient tomorrow, our as outpatient)  -Colchicine 0.6 mg PO BID x3 months  -Will defer NSAIDS as now symptom free  -Given diagnosis of myopericarditis she has been instructed to refrain from strenuous activity for 6 months (which includes collegiate volleyball)    Kelly Burr MD  Cardiology - [facility]  Pager:  [phone]  October 29, 2020

## 2020-10-29 NOTE — PLAN OF CARE
DATE & TIME: 10/29/2020 4148-4053   Cognitive Concerns/ Orientation : a&ox4   BEHAVIOR & AGGRESSION TOOL COLOR: green  CIWA SCORE: n/a   ABNL VS/O2: WNL  MOBILITY: Ind  PAIN MANAGMENT: denies  DIET: no-caffeine  BOWEL/BLADDER: continent  ABNL LAB/BG: Troponin 0.066, 0.063  DRAIN/DEVICES: PIV SL  TELEMETRY RHYTHM: NSR  SKIN: WDL.  TESTS/PROCEDURES: EKG, cardiology consult  D/C DAY/GOALS/PLACE: pending tests, consult and COVID result.  OTHER IMPORTANT INFO:

## 2020-10-29 NOTE — PLAN OF CARE
Obs goals  -diagnostic tests and consults completed and resulted- Not Met   -vital signs normal or at patient baseline- Met  Nurse to notify provider when observation goals have been met and patient is ready for discharge.

## 2020-10-29 NOTE — PHARMACY-ADMISSION MEDICATION HISTORY
Pharmacy Medication History  Admission medication history interview status for the 10/28/2020  admission is complete. See EPIC admission navigator for prior to admission medications       Medication history sources: Patient  Location of interview: Phone  Medication history source reliability: Good  Adherence assessment: Good    Medication reconciliation completed by provider prior to medication history? No    Time spent in this activity: 10 minutes      Prior to Admission medications    Medication Sig Last Dose Taking? Auth Provider   acetaminophen (TYLENOL) 325 MG tablet Take 325-650 mg by mouth every 6 hours as needed Past Week at Unknown time Yes Reported, Patient   etonogestrel-ethinyl estradiol (NUVARING) 0.12-0.015 MG/24HR vaginal ring Place 1 each vaginally every 28 days Past Month at Unknown time Yes Nupur Sanchez APRN CNP   SUMAtriptan (IMITREX) 20 MG/ACT nasal spray Spray 1 spray in nostril as needed for migraine May repeat in 2 hours. Max 2 sprays/24 hours.  at New, has not started. Yes Nupur Sanchez APRN CNP   SUMAtriptan (IMITREX) 50 MG tablet Take 1 tablet (50 mg) by mouth at onset of headache for migraine May repeat in 2 hours. Max 4 tablets/24 hours.  at Never used Yes Nupur Sanchez APRN CNP   ondansetron (ZOFRAN) 4 MG tablet Take 2 tablets (8 mg) by mouth every 6 hours as needed for nausea More than a month at Unknown time  Nupur Sanchez APRN CNP Sharon Chandler, PharmD, BCPS

## 2020-10-29 NOTE — DISCHARGE SUMMARY
[facility]  Hospitalist Discharge Summary      Date of Admission:  10/28/2020  Date of Discharge:  10/29/2020  Discharging Provider: Melissa Cho MD      Discharge Diagnoses   Probable myopericarditis   Small pericardial effusion:  Secondary to #1  COVID exposure:  Negative COVID test x2    Follow-ups Needed After Discharge   Follow-up Appointments     Follow-up and recommended labs and tests       Follow up with cardiology with MRI as they have scheduled.             Unresulted Labs Ordered in the Past 30 Days of this Admission     No orders found for last 31 day(s).          Discharge Disposition   Discharged to home  Condition at discharge: Good      Hospital Course   Ricardo Antunez is a 19 year old female admitted on 10/28/2020. She presented as a direct admission from [facility] emergency department in the setting of mildly elevated troponin and anterior chest discomfort, some mild URI symptoms in the preceding week. She had a recent mild cough which has since resolved in the preceding 1 to 2 weeks, and she was exposed to someone who tested positive for Covid. No pleuritic component to chest discomfort.     Probable myocarditis  Chest pain with mild troponin elevation secondary to above   Small pericardial effusion secondary to above.   Covid negative x 2   * Her troponin was ~ 0.06 and remained stable  * D-dimer negative. BNP normal.   * Echo showed nl EF 60-65%, no regional WMAs. Nl RV size and function.  No evidence of significant valve pathology.  Small pericardial effusion.     - Chest pain resolved.   - Cardiology consulted and recommended the following:   - Cardiac MRI scheduled as outpatient next week.   - Started colchicine 0.6 mg PO BID x 3 months  - We did not start NSAIDS as chest pain resolved.   - Given diagnosis of myopericarditis she has been instructed to refrain from strenuous activity for 6 months (which includes collegiate volleyball)    Consultations This  Hospital Stay   CARDIOLOGY IP CONSULT    Code Status   Full Code    Time Spent on this Encounter   I, Melissa Cho MD, personally saw the patient today and spent greater than 30 minutes discharging this patient.       Melissa Cho MD  [place] [address] [facility]  [address] [place] [address]  Phone: [phone]  ______________________________________________________________________    Physical Exam   Vital Signs: Temp: 98.1  F (36.7  C) Temp src: Oral BP: 115/71 Pulse: 77   Resp: 20 SpO2: 98 % O2 Device: None (Room air)    Weight: 190 lbs 0 oz  Constitutional:  NAD,   Neuropsyche:  alert and oriented, answers questions appropriately.   Respiratory:  Breathing comfortably, good air exchange, no wheezes, no crackles.   Cardiovascular:  Regular rate and rhythm, no edema.  GI:  soft, NT/ND, BS normal  Skin/Integumen:  No acute rash, bruising or sign of bleeding.          Primary Care Physician   Nupur Sanchez    Discharge Orders      MRI Cardiac w/contrast     Lipid Profile     Discharge Order: F/U with Cardiac  ZLULY      Reason for your hospital stay    You were admitted with chest pain likely due to inflammation of the heart muscle. This is likely due to a recent viral infection. You have had 2 negative PCR Covid tests, which makes Covid a less likely cause.     Follow-up and recommended labs and tests     Follow up with cardiology with MRI as they have scheduled.     Activity    Your activity upon discharge:Refrain from strenuous activity for 6 months (which includes collegiate volleyball)     Diet    Regular diet       Significant Results and Procedures   Most Recent 3 CBC's:  Recent Labs   Lab Test 10/28/20  1706   WBC 5.7   HGB 14.9   MCV 89        Most Recent 3 BMP's:  Recent Labs   Lab Test 10/28/20  1706      POTASSIUM 3.7   CHLORIDE 109   CO2 24   BUN 10   CR 0.81   ANIONGAP 7   JEREMIAH 9.7   GLC 81     Most Recent 2 LFT's:No lab results found.  Most  Recent 3 Troponin's:  Recent Labs   Lab Test 10/29/20  0429 10/29/20  0029 10/28/20  195   TROPI 0.063* 0.066* 0.065*     Most Recent 3 BNP's:  Recent Labs   Lab Test 10/28/20  1706   NTBNPI 48     Most Recent D-dimer:  Recent Labs   Lab Test 10/28/20  1706   DD 0.4     Most Recent Cholesterol Panel:  Recent Labs   Lab Test 10/29/20  0029   CHOL 214*   *   HDL 68   TRIG 149*     Most Recent TSH and T4:  Recent Labs   Lab Test 10/28/20  170   TSH 2.39     COVID-19 Antibody Results, Testing for Immunity    COVID-19 Antibody Results, Testing for Immunity   No data to display.         COVID-19 PCR Results    COVID-19 PCR Results 10/26/20 10/28/20 10/28/20     182 1821   COVID-19 Virus PCR to [facility] - Result  Test received-See reflex to IDDL test SARS CoV2 (COVID-19) Virus RT-PCR    COVID-19 Virus PCR to [facility] - Source  Nasopharyngeal    COVID-19 Virus by PCR (External Result) Negative     SARS-CoV-2 Virus Specimen Source   Nasopharyngeal   SARS-CoV-2 PCR Result   NEGATIVE      Comments are available for some flowsheets but are not being displayed.           ,   Results for orders placed or performed during the hospital encounter of 10/28/20   Echocardiogram Complete    Narrative    [id number]  [id number]  [id number]  [id number]^HERNANDEZ^DEANNE^[facility]  Echocardiography Laboratory  [address] [place] [id number]        Name: SALLY LI  MRN: [id number]  : 2000  Study Date: 10/29/2020 11:34 AM  Age: 19 yrs  Gender: Female  Patient Location: [facility]  Reason For Study: Myocarditis  Ordering Physician: DEANNE HERNANDEZ  Referring Physician: ANGEL ZHONG  Performed By: Diallo Tipton RDCS     BSA: 2.1 m2  Height: 74 in  Weight: 190 lb  BP: 124/69 mmHg  _____________________________________________________________________________  __        Procedure  Complete Portable Echo Adult.  _____________________________________________________________________________  __         Interpretation Summary     1. Left ventricular systolic function is normal. The visual ejection fraction  is estimated at 60-65%.  2. No regional wall motion abnormalities noted.  3. The right ventricle is normal in structure, function and size.  4. No evidence for significant valvular pathology.  5. Small pericardial effusion adjacent to RV free wall. No tamponade  physiology.  _____________________________________________________________________________  __        Left Ventricle  The left ventricle is normal in size. There is normal left ventricular wall  thickness. Left ventricular systolic function is normal. The visual ejection  fraction is estimated at 60-65%. Left ventricular diastolic function is  normal. No regional wall motion abnormalities noted.     Right Ventricle  The right ventricle is normal in structure, function and size.     Atria  Normal left atrial size. Right atrial size is normal. There is no color  Doppler evidence of an atrial shunt.     Mitral Valve  The mitral valve is normal in structure and function.        Tricuspid Valve  The tricuspid valve is normal in structure and function.     Aortic Valve  The aortic valve is normal in structure and function.     Pulmonic Valve  The pulmonic valve is normal in structure and function.     Vessels  Normal size aorta. IVC diameter <2.1 cm collapsing >50% with sniff suggests a  normal RA pressure of 3 mmHg.     Pericardium  Small pericardial effusion. Small pericardial effusion adjacent to RV free  wall. No tamponade physiology.        Rhythm  Sinus rhythm was noted.  _____________________________________________________________________________  __  MMode/2D Measurements & Calculations     IVSd: 0.99 cm  LVIDd: 5.2 cm  LVIDs: 3.3 cm  LVPWd: 1.1 cm  FS: 36.6 %  LV mass(C)d: 197.9 grams  LV mass(C)dI: 93.1 grams/m2  Ao root diam: 3.7 cm  LA dimension: 2.2 cm  asc Aorta Diam: 2.8 cm  LA/Ao: 0.59  LA Volume (BP): 38.6 ml  LA Volume Index (BP): 18.1  ml/m2  RWT: 0.41           Doppler Measurements & Calculations  MV E max juanito: 60.6 cm/sec  MV A max juanito: 41.2 cm/sec  MV E/A: 1.5  MV dec time: 0.24 sec  PA V2 max: 118.1 cm/sec  PA max P.6 mmHg  PA mean P.7 mmHg  PA V2 VTI: 23.3 cm  PA acc time: 0.15 sec  Pulm Sys Juanito: 49.0 cm/sec  Pulm [place] Juanito: 65.2 cm/sec  Pulm A Revs Juanito: 20.9 cm/sec  Pulm A Revs Dur: 0.13 sec  Pulm S/D: 0.75  E/E' av.7  Lateral E/e': 4.7  Medial E/e': 6.6           _____________________________________________________________________________  __           Report approved by: Freddy Sheppard 10/29/2020 12:28 PM            Discharge Medications   Current Discharge Medication List      START taking these medications    Details   colchicine (COLCYRS) 0.6 MG tablet Take 1 tablet (0.6 mg) by mouth 2 times daily  Qty: 60 tablet, Refills: 0    Comments: Future refills by PCP Cardiology or Dr. Nupur Sanchez with phone number [phone].  Associated Diagnoses: Subacute viral myocarditis         CONTINUE these medications which have NOT CHANGED    Details   acetaminophen (TYLENOL) 325 MG tablet Take 325-650 mg by mouth every 6 hours as needed      etonogestrel-ethinyl estradiol (NUVARING) 0.12-0.015 MG/24HR vaginal ring Place 1 each vaginally every 28 days  Qty: 3 each, Refills: 0    Associated Diagnoses: DUB (dysfunctional uterine bleeding)      SUMAtriptan (IMITREX) 20 MG/ACT nasal spray Spray 1 spray in nostril as needed for migraine May repeat in 2 hours. Max 2 sprays/24 hours.  Qty: 6 each, Refills: 5    Associated Diagnoses: Migraine with aura and without status migrainosus, not intractable      SUMAtriptan (IMITREX) 50 MG tablet Take 1 tablet (50 mg) by mouth at onset of headache for migraine May repeat in 2 hours. Max 4 tablets/24 hours.  Qty: 16 tablet, Refills: 3    Associated Diagnoses: Migraine with aura and without status migrainosus, not intractable      ondansetron (ZOFRAN) 4 MG tablet Take 2 tablets (8 mg) by  mouth every 6 hours as needed for nausea  Qty: 10 tablet, Refills: 0    Associated Diagnoses: Migraine with aura and without status migrainosus, not intractable           Allergies   Allergies   Allergen Reactions     Cats

## 2020-10-29 NOTE — H&P
[facility]    History and Physical - Hospitalist Service       Date of Admission:  10/28/2020    Assessment & Plan   Ricardo Antunez is a 19 year old female admitted on 10/28/2020. She presents as a direct admission from [facility] emergency department in the setting of mildly elevated troponin and anterior chest discomfort, some mild URI symptoms in the preceding week.    Myocarditis: Patient with relatively stable mild elevation in troponin.  Recent mild cough which has since resolved in the preceding 1 to 2 weeks.  No pleuritic component to discomfort.  -TTE.  Patient currently without any evidence of heart failure, and could consider TTE at a later date if unable to perform while inpatient.  BNP within normal limits  -COVID-19 rule out; patient reports that she had a negative rapid COVID-19 in the prior week  -Cardiac telemetry  -Cardiology consulted; cardiology team was actually contacted regarding patient from outside hospital prior to transfer.  -No anticoagulation at this time.  Low suspicion for ACS.  If Covid positive, might need to consider anticoagulation, though patient largely asymptomatic other than myocarditis, and in this setting, most reasonable treatment might actually be high-dose aspirin therapy.  -Admitted to observation status, anticipate discharge 10/29 afternoon.  -Acetaminophen, as needed oxycodone available if needed for pain, though note that patient does not have any significant amount of pain.  -Would consider remdesivir for myocarditis if Covid positive  -Lipid panel pending  -Note that patient has an upcoming appointment for future wisdom tooth extraction.  If patient requires greater than 6 weeks of NSAIDs or anticoagulation, would further delineate with dentistry regarding timing of procedure, though currently not urgent.       Diet:  Regular.diet as tolerated  DVT Prophylaxis: Ambulate every shift  Myers Catheter: not present  Code Status:  Full  code  Rule Out COVID-19 Handoff:  Ricardo is a LOW SUSPICION PUI.  Follow these instructions:    If COVID test positive -> continue isolation precautions    If COVID test negative -> discontinue COVID-specific isolation precautions    Patient appears to have viral pericarditis.  She very well could have COVID-19, though any number of respiratory viruses could have resulted in this.  Prior negative rapid testing, and is greater than 10 days out from onset of symptoms after initial exposure       Disposition Plan   Expected discharge: Tomorrow, recommended to prior living arrangement once Covid ruled in versus ruled out, TTE and cardiology consult complete.  Additional medical interventions for myocarditis pending potential Covid positive result..  Entered: Angelo Louis MD 10/28/2020, 11:01 PM     The patient's care was discussed with the Patient and accepting physician, Dr Stearns.    Angelo Louis MD  [facility]  Contact information available via [facility] Paging/Directory      ______________________________________________________________________    Chief Complaint   Chest discomfort    History is obtained from patient, chart review, discussion with accepting physician.    History of Present Illness   Ricardo Antunez is a 19 year old female who presents as a direct admission from [facility] emergency department after complaining of anterior chest pain and found to have a mildly elevated troponin.    Patient with no significant prior medical illnesses other than a history of migraine headaches which appear to not be an issue for her any longer.  Her only medication is a NuvaRing birth control.    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  She plays volleyball.  The majority of her classes are online with the exception of a lab.  She tells me that approximately 2 weeks ago she had close contact with a friend who tested positive for COVID-19 a few days later.  1-1/2 weeks  ago she notes that she had a mild cough.  No fevers, no sputum production, no shortness of breath.  She was tested with a rapid Covid test at that time, and was negative.  Her cough has since resolved.  Over the past 1-1/2 weeks, she has also had some occasional chest discomfort.  She reports very minor twinges of chest discomfort left of midline in the past weeks.  Today, however, she had more persistent discomfort in her anterior chest, bilaterally, approximately at T2-T3 level as she gestures, parasternal.  This discomfort led her to be evaluated in the emergency department where she was found to have a mildly elevated troponin.    Cardiology was contacted from outside emergency department and recommended against heparinization.  EKG without concerning ST elevations, EKG does not appear consistent with pericarditis.    Patient with no pleuritic discomfort, D-dimer within normal limits.    No nausea, no vomiting, no diarrhea, no abdominal pain.    Review of Systems    The 10 point Review of Systems is negative other than noted in the HPI or here.  No fever  No shaking chills    Past Medical History    I have reviewed this patient's medical history and updated it with pertinent information if needed.   No significant past medical history    Past Surgical History   I have reviewed this patient's surgical history and updated it with pertinent information if needed.  No prior surgeries    Social History   I have reviewed this patient's social history and updated it with pertinent information if needed.  Social History     Tobacco Use     Smoking status: Never Smoker     Smokeless tobacco: Never Used   Substance Use Topics     Alcohol use: No     Drug use: No       Family History   I have reviewed this patient's family history and updated it with pertinent information if needed.  Family History   Problem Relation Age of Onset     Cancer Maternal Grandfather         leukemia   Mother with hypothyroidism following  "Hashimoto's thyroiditis    Prior to Admission Medications   Prior to Admission Medications   Prescriptions Last Dose Informant Patient Reported? Taking?   SUMAtriptan (IMITREX) 20 MG/ACT nasal spray   No No   Sig: Spray 1 spray in nostril as needed for migraine May repeat in 2 hours. Max 2 sprays/24 hours.   SUMAtriptan (IMITREX) 50 MG tablet   No No   Sig: Take 1 tablet (50 mg) by mouth at onset of headache for migraine May repeat in 2 hours. Max 4 tablets/24 hours.   acetaminophen (TYLENOL) 325 MG tablet   Yes No   Sig: Take 325-650 mg by mouth every 6 hours as needed   etonogestrel-ethinyl estradiol (NUVARING) 0.12-0.015 MG/24HR vaginal ring   No No   Sig: Insert one device vaginally every 21 days.Out for 7 days then replace with new one. .   etonogestrel-ethinyl estradiol (NUVARING) 0.12-0.015 MG/24HR vaginal ring   No No   Sig: Place 1 each vaginally every 28 days   ondansetron (ZOFRAN) 4 MG tablet   No No   Sig: Take 2 tablets (8 mg) by mouth every 6 hours as needed for nausea      Facility-Administered Medications: None     Allergies   Allergies   Allergen Reactions     Cats        Physical Exam    Vital signs:  Temp: 98.6  F (37  C) Temp src: Oral BP: 123/68 Pulse: 77   Resp: 16 SpO2: 99 % O2 Device: None (Room air)   Height: 188 cm (6' 2\") Weight: 86.2 kg (190 lb)  Estimated body mass index is 24.39 kg/m  as calculated from the following:    Height as of this encounter: 1.88 m (6' 2\").    Weight as of this encounter: 86.2 kg (190 lb).    General Appearance: Well-appearing 19-year-old female resting comfortably in bed.  No distress.  Eyes: No scleral icterus or injection  HEENT: Normocephalic, atraumatic  Respiratory: Breath sounds are clear bilateral auscultation without wheezes or crackles.  Good air and withdrawal lung fields, no splinting.  Cardiovascular: Regular rate and rhythm, no murmur, no rub  GI: Abdomen thin, soft, nontender to palpation.  No palpable mass.  Lymph/Hematologic: No lower extremity " edema  Genitourinary: Not examined  Skin: No rashes, no petechiae  Musculoskeletal: Muscular tone intact in all extremities  Neurologic: Alert, conversant, appropriate conversation.  Mental status grossly intact.  Psychiatric: Normal affect, very pleasant    Data   Data reviewed today: I reviewed all medications, new labs and imaging results over the last 24 hours. I personally reviewed the EKG tracing showing Sinus rhythm, not consistent with pericarditis.    Recent Labs   Lab 10/29/20  0429 10/29/20  0029 10/28/20  1952 10/28/20  1706   WBC  --   --   --  5.7   HGB  --   --   --  14.9   MCV  --   --   --  89   PLT  --   --   --  247   NA  --   --   --  140   POTASSIUM  --   --   --  3.7   CHLORIDE  --   --   --  109   CO2  --   --   --  24   BUN  --   --   --  10   CR  --   --   --  0.81   ANIONGAP  --   --   --  7   JEREMIAH  --   --   --  9.7   GLC  --   --   --  81   TROPI 0.063* 0.066* 0.065* 0.063*

## 2020-10-29 NOTE — PLAN OF CARE
Discharge    Patient discharged to home with mom  Care plan note  Discharge instructions reviewed, questions answered. IV removed. Belongings accounted for    Listed belongings gathered and returned to patient. yes  Care Plan and Patient education resolved: yes  Prescriptions if needed, hard copies sent with patient  yes  Home and hospital acquired medications returned to patient: NA  Medication Bin checked and emptied on discharge yes  Follow up appointment made for patient: yes

## 2020-10-29 NOTE — ED NOTES
Attempted to call report to Tarun Castro. Informed by their OBS unit there's another pt assigned to the same bed as this patient. ED HUC and charge updated. Contacting [facility] patient placement.

## 2020-10-30 ENCOUNTER — MYC MEDICAL ADVICE (OUTPATIENT)
Dept: FAMILY MEDICINE | Facility: CLINIC | Age: 20
End: 2020-10-30

## 2020-10-30 ENCOUNTER — TELEPHONE (OUTPATIENT)
Dept: CARDIOLOGY | Facility: CLINIC | Age: 20
End: 2020-10-30

## 2020-10-30 ENCOUNTER — TELEPHONE (OUTPATIENT)
Dept: FAMILY MEDICINE | Facility: CLINIC | Age: 20
End: 2020-10-30

## 2020-10-30 DIAGNOSIS — I40.0 SUBACUTE VIRAL MYOCARDITIS: Primary | ICD-10-CM

## 2020-10-30 NOTE — TELEPHONE ENCOUNTER
"ED/Discharge Protocol    \"Hi, my name is Kayy Todd RN, a registered nurse, and I am calling on behalf of Nupur Sanchez's office at [place].  I am calling to follow up and see how things are going for you after your recent visit.\"    \"I see that you were in the (ER/UC/IP) on 10/28/20.    How are you doing now that you are home?\" feels fine she says    Is patient experiencing symptoms that may require a hospital visit?  no    Discharge Instructions    \"Let's review your discharge instructions.  What is/are the follow-up recommendations?  Pt. Response: follow up with PCP    \"Were you instructed to make a follow-up appointment?\"  Pt. Response: Yes.  Has appointment been made?   Yes      \"When you see the provider, I would recommend that you bring your discharge instructions with you.    Medications    \"How many new medications are you on since your hospitalization/ED visit?\"    0-1  \"How many of your current medicines changed (dose, timing, name, etc.) while you were in the hospital/ED visit?\"   0-1  \"Do you have questions about your medications?\"   No  \"Were you newly diagnosed with heart failure, COPD, diabetes or did you have a heart attack?\"   No  For patients on insulin: \"Did you start on insulin in the hospital or did you have your insulin dose changed?\"   No  Post Discharge Medication Reconciliation Status: discharge medications reconciled, continue medications without change.    Was MTM referral placed (*Make sure to put transitions as reason for referral)?   No    Call Summary    \"Do you have any questions or concerns about your condition or care plan at the moment?\"    No  Triage nurse advice given: call if questions    \"If you have questions or things don't continue to improve, we encourage you contact us through the main clinic number,  [phone].  Even if the clinic is not open, triage nurses are available 24/7 to help you.     We would like you to know that our clinic has extended hours " "(provide information).  We also have urgent care (provide details on closest location and hours/contact info)\"      \"Thank you for your time and take care!\"        "

## 2020-10-30 NOTE — TELEPHONE ENCOUNTER
ED/UC/IP follow up phone call:    RN please call to follow up.    Number of ED visits in past 12 months = 0  [facility]

## 2020-10-30 NOTE — TELEPHONE ENCOUNTER
Patient was evaluated by cardiology while inpatient for anterior chest pain x 2 weeks. 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 She had been in contact with a friend who tested positive for COVID 19 and had noted a mild cough several days after seeing her. Echocardiogram: Left ventricular systolic function is normal. The visual ejection fraction is estimated at 60-65%. No regional wall motion abnormalities noted. The right ventricle is normal in structure, function and size. No evidence for significant valvular pathology. Small pericardial effusion adjacent to RV free wall. No tamponade physiology. Suggestive of myopericarditis. COVID negative. Started on Colchicine with plan for OP cMRI. Called patient to discuss any post hospital d/c questions she may have, review medication changes, and confirm f/u appts. Patient denied any questions regarding new medications or changes to PTA medications. Patient denied any SOB, chest pain, or light headedness. RN confirmed with patient that has a cMRI scheduled on 11/3/20 at 1300, and she has an OV scheduled on 11/5/20 at 1515 with WILLIAM Elise Tompkins at our [facility]. No strenuous activity, including volleyball, x 6 months. Patient advised to call clinic with any cardiac related questions or concerns prior to this william't. Patient verbalized understanding and agreed with plan. BARI Hughes RN.

## 2020-11-02 DIAGNOSIS — I40.0 SUBACUTE VIRAL MYOCARDITIS: ICD-10-CM

## 2020-11-02 PROCEDURE — 86769 SARS-COV-2 COVID-19 ANTIBODY: CPT | Performed by: NURSE PRACTITIONER

## 2020-11-02 PROCEDURE — 36415 COLL VENOUS BLD VENIPUNCTURE: CPT | Performed by: NURSE PRACTITIONER

## 2020-11-03 ENCOUNTER — HOSPITAL ENCOUNTER (OUTPATIENT)
Dept: CARDIOLOGY | Facility: CLINIC | Age: 20
Discharge: HOME | End: 2020-11-03
Attending: NURSE PRACTITIONER | Admitting: NURSE PRACTITIONER
Payer: COMMERCIAL

## 2020-11-03 ENCOUNTER — VIRTUAL VISIT (OUTPATIENT)
Dept: FAMILY MEDICINE | Facility: CLINIC | Age: 20
End: 2020-11-03
Payer: COMMERCIAL

## 2020-11-03 DIAGNOSIS — G43.109 MIGRAINE WITH AURA AND WITHOUT STATUS MIGRAINOSUS, NOT INTRACTABLE: ICD-10-CM

## 2020-11-03 DIAGNOSIS — I40.0 SUBACUTE VIRAL MYOCARDITIS: ICD-10-CM

## 2020-11-03 DIAGNOSIS — N93.8 DUB (DYSFUNCTIONAL UTERINE BLEEDING): ICD-10-CM

## 2020-11-03 LAB
COVID-19 SPIKE RBD ABY TITER: NORMAL
COVID-19 SPIKE RBD ABY: NEGATIVE

## 2020-11-03 PROCEDURE — A9585 GADOBUTROL INJECTION: HCPCS | Performed by: NURSE PRACTITIONER

## 2020-11-03 PROCEDURE — 99213 OFFICE O/P EST LOW 20 MIN: CPT | Mod: GT | Performed by: NURSE PRACTITIONER

## 2020-11-03 PROCEDURE — 75561 CARDIAC MRI FOR MORPH W/DYE: CPT

## 2020-11-03 PROCEDURE — 255N000002 HC RX 255 OP 636: Performed by: NURSE PRACTITIONER

## 2020-11-03 PROCEDURE — 75561 CARDIAC MRI FOR MORPH W/DYE: CPT | Mod: 26 | Performed by: INTERNAL MEDICINE

## 2020-11-03 RX ORDER — GADOBUTROL 604.72 MG/ML
11 INJECTION INTRAVENOUS ONCE
Status: COMPLETED | OUTPATIENT
Start: 2020-11-03 | End: 2020-11-03

## 2020-11-03 RX ORDER — ETONOGESTREL AND ETHINYL ESTRADIOL VAGINAL RING .015; .12 MG/D; MG/D
1 RING VAGINAL
Qty: 3 EACH | Refills: 3 | Status: SHIPPED | OUTPATIENT
Start: 2020-11-03

## 2020-11-03 RX ORDER — SUMATRIPTAN 20 MG/1
1 SPRAY NASAL PRN
Qty: 6 EACH | Refills: 5 | Status: SHIPPED | OUTPATIENT
Start: 2020-11-03

## 2020-11-03 RX ORDER — SUMATRIPTAN 50 MG/1
50 TABLET, FILM COATED ORAL
Qty: 16 TABLET | Refills: 3 | Status: SHIPPED | OUTPATIENT
Start: 2020-11-03

## 2020-11-03 RX ADMIN — GADOBUTROL 11 ML: 604.72 INJECTION INTRAVENOUS at 14:57

## 2020-11-03 NOTE — PROGRESS NOTES
"Ricardo Antunez is a 19 year old female who is being evaluated via a billable video visit.      The patient has been notified of following:     \"This video visit will be conducted via a call between you and your physician/provider. We have found that certain health care needs can be provided without the need for an in-person physical exam.  This service lets us provide the care you need with a video conversation.  If a prescription is necessary we can send it directly to your pharmacy.  If lab work is needed we can place an order for that and you can then stop by our lab to have the test done at a later time.    Video visits are billed at different rates depending on your insurance coverage.  Please reach out to your insurance provider with any questions.    If during the course of the call the physician/provider feels a video visit is not appropriate, you will not be charged for this service.\"    Patient has given verbal consent for Video visit? Yes  How would you like to obtain your AVS? MyChart  If you are dropped from the video visit, the video invite should be resent to: Text to cell phone: [phone]  Will anyone else be joining your video visit? No      Subjective     Ricardo Antunez is a 19 year old female who presents today via video visit for the following health issues:    HPI     Migraine     Since your last clinic visit, how have your headaches changed?  Improved, but did get a couple migraines beginning of October, but has been fine since    How often are you getting headaches or migraines? Random, doesn't see a specific pattern     Are you able to do normal daily activities when you have a migraine? No    Are you taking rescue/relief medications? (Select all that apply) sumatriptan (Imitrex)    How helpful is your rescue/relief medication?  I get some relief- goes to sleep after taking imitrex and when she wakes up, they're gone    Are you taking any medications to prevent migraines? (Select all " that apply)  No    In the past 4 weeks, how often have you gone to urgent care or the emergency room because of your headaches?  0      How many servings of fruits and vegetables do you eat daily?  4 or more    On average, how many sweetened beverages do you drink each day (Examples: soda, juice, sweet tea, etc.  Do NOT count diet or artificially sweetened beverages)?   0    How many days per week do you exercise enough to make your heart beat faster? 6    How many minutes a day do you exercise enough to make your heart beat faster? 20 - 29    How many days per week do you miss taking your medication? 0    Medication Followup of Contraception-Nuvaring    Taking Medication as prescribed: yes    Side Effects:  None    Medication Helping Symptoms:  Yes    LMP 2 weeks     Changing her nuvaring every 3 weeks    No side effects          School is going good right now. Stressful   2 online classes on a [employer] class    Recent hospitalization subactue viral myocarditis  Taking cholchicine no side effects tolerating well  No chest pain.. just doesn't feel right  Some cold like symptoms with slight cough in September  Brother also had cold like symptoms with chest pain about that time as well.   Had COVID antibody testing done last night  Has MRI at [facility] scheduled for today with Cardiology follow up on Thursday.       Video Start Time: 857        Review of Systems   Constitutional, HEENT, cardiovascular, pulmonary, GI, , musculoskeletal, neuro, skin, endocrine and psych systems are negative, except as otherwise noted.      Objective           Vitals:  No vitals were obtained today due to virtual visit.    Physical Exam     GENERAL: Healthy, alert and no distress  EYES: Eyes grossly normal to inspection.  No discharge or erythema, or obvious scleral/conjunctival abnormalities.  RESP: No audible wheeze, cough, or visible cyanosis.  No visible retractions or increased work of breathing.    SKIN: Visible skin clear. No  significant rash, abnormal pigmentation or lesions.  NEURO: Cranial nerves grossly intact.  Mentation and speech appropriate for age.  PSYCH: Mentation appears normal, affect normal/bright, judgement and insight intact, normal speech and appearance well-groomed.      No results found for any visits on 11/03/20.        Assessment & Plan     Ricardo was seen today for headache and contraception.    Diagnoses and all orders for this visit:    Migraine with aura and without status migrainosus, not intractable  -     SUMAtriptan (IMITREX) 50 MG tablet; Take 1 tablet (50 mg) by mouth at onset of headache for migraine May repeat in 2 hours. Max 4 tablets/24 hours.  -     SUMAtriptan (IMITREX) 20 MG/ACT nasal spray; Spray 1 spray in nostril as needed for migraine May repeat in 2 hours. Max 2 sprays/24 hours.    DUB (dysfunctional uterine bleeding)  -     etonogestrel-ethinyl estradiol (NUVARING) 0.12-0.015 MG/24HR vaginal ring; Place 1 each vaginally every 28 days         Subacute viral myocarditis  Follow up MRI and Cardiology as planned.   Continue meds as directed    Call or return to the clinic with any worsening of symptoms or no resolution. Patient/Parent verbalized understanding and is in agreement. Medication side effects reviewed.   Current Outpatient Medications   Medication Sig Dispense Refill     acetaminophen (TYLENOL) 325 MG tablet Take 325-650 mg by mouth every 6 hours as needed       colchicine (COLCYRS) 0.6 MG tablet Take 1 tablet (0.6 mg) by mouth 2 times daily 60 tablet 0     etonogestrel-ethinyl estradiol (NUVARING) 0.12-0.015 MG/24HR vaginal ring Place 1 each vaginally every 28 days 3 each 3     ondansetron (ZOFRAN) 4 MG tablet Take 2 tablets (8 mg) by mouth every 6 hours as needed for nausea 10 tablet 0     SUMAtriptan (IMITREX) 20 MG/ACT nasal spray Spray 1 spray in nostril as needed for migraine May repeat in 2 hours. Max 2 sprays/24 hours. 6 each 5     SUMAtriptan (IMITREX) 50 MG tablet Take 1 tablet  (50 mg) by mouth at onset of headache for migraine May repeat in 2 hours. Max 4 tablets/24 hours. 16 tablet 3     As the provider for this telephone/video service, I attest that I introduced myself to the patient, provided my credentials, disclosed my location, and determined that, based on a review of the patient's chart and/or a discussion with members of the patient's treatment team, a telephone/video visit is an appropriate and effective means of providing this service. The patient and I mutually agree that this visit is appropriate for telephone/video visit as well.    DEWAYNE Camacho CNP  [facility]      Video-Visit Details    Type of service:  Video Visit    Video End Time:910    Originating Location (pt. Location): Home    Distant Location (provider location):  [facility]     Platform used for Video Visit: Iram

## 2020-11-05 ENCOUNTER — VIRTUAL VISIT (OUTPATIENT)
Dept: CARDIOLOGY | Facility: CLINIC | Age: 20
End: 2020-11-05
Attending: NURSE PRACTITIONER
Payer: COMMERCIAL

## 2020-11-05 DIAGNOSIS — I40.0 SUBACUTE VIRAL MYOCARDITIS: Primary | ICD-10-CM

## 2020-11-05 PROCEDURE — 99214 OFFICE O/P EST MOD 30 MIN: CPT | Mod: 95 | Performed by: NURSE PRACTITIONER

## 2020-11-05 NOTE — PROGRESS NOTES
"Ricardo Antunez is a 19 year old female who is being evaluated via a billable video visit.      The patient has been notified of following:     \"This video visit will be conducted via a call between you and your physician/provider. We have found that certain health care needs can be provided without the need for an in-person physical exam.  This service lets us provide the care you need with a video conversation.  If a prescription is necessary we can send it directly to your pharmacy.  If lab work is needed we can place an order for that and you can then stop by our lab to have the test done at a later time.    Video visits are billed at different rates depending on your insurance coverage.  Please reach out to your insurance provider with any questions.    If during the course of the call the physician/provider feels a video visit is not appropriate, you will not be charged for this service.\"    Patient has given verbal consent for Video visit? Yes  How would you like to obtain your AVS? Mail a copy  If you are dropped from the video visit, the video invite should be resent to: Send to e-mail at: [email or web address]  Will anyone else be joining your video visit? No        Video-Visit Details    Type of service:  Video Visit    Video Start Time: 3:38 PM  Video End Time: 3:54 PM    Originating Location (pt. Location): Other Car    Distant Location (provider location):  [facility]     Platform used for Video Visit: DEWAYNE Bullard [facility]        Cardiology Clinic Progress Note  Ricardo Antunez MRN# [id number]   YOB: 2000 Age: 19 year old      Primary Cardiologist:   Dr. Burr          History of Presenting Illness:      Ricardo Antunez is a pleasant 19 year old patient with a past cardiac history significant for   1. Myopericarditis     Patient was hospitalized on 10/28/2020 with chest pain and mild upper respiratory symptoms.  She reported a " Covid exposure but during hospitalization tested negative x2 for Covid.  Troponin was slightly elevated she was felt to likely have myopericarditis.  ESR and CRP were WNL.  Echocardiogram showed EF 60-65%, no WMA's, no significant valvular disease, and small pericardial effusion.  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  They recommended follow-up cardiac MRI.    Pt presents today for hospital follow-up. Cardiac MRI 11/3/2020 showing normal biventricular function with trivial pericardial effusion and delayed enhancement was negative for infiltrative disease or scar.  These results were reviewed with her today.    She denies any side effects on colchicine.  She has not had to refill this yet so is unsure what the cost is.  If this is too expensive, she will let us know.  She continues with some mild chest discomfort which has improved but not resolved since hospitalization.  She is aware that she should not do any strenuous activity or play volleyball for 6 months.  We did discuss that 3 to 4 weeks after her chest pain resolved she could do some light activity, which I discussed with Dr. Goodwin today. Patient reports no shortness of breath, PND, orthopnea, presyncope, syncope, edema, heart racing, or palpitations.      Current Cardiac Medications   Colchicine 0.6 mg twice daily                   Assessment and Plan:       Plan  Continue colchicine for total of 3 months    Recommendations:  1.  Call if chest pain is getting worse   2.  No strenuous exercise or activity for 6 months    Follow-up:  1. Call to schedule nonfasting lab (troponin, ESR, CRP) - we will call with results   2. See cardiologist for follow up at [facility]: Feb 2021.      1. Acute myopericarditis    Mildly elevated troponin during hospitalization 0.066    Cardiac MRI negative for infiltrative disease or scar    Small pericardial effusion  during hospitalization improved to trivial on follow-up MRI    chest pain improving but not resolved     Continue colchicine x3 months    No strenuous exercise or activity x6 months         Thank you for allowing me to participate in this delightful patient's care.      This note was completed in part using Dragon voice recognition software. Although reviewed after completion, some word and grammatical errors may occur.    Elise Ca, APRN CNP, APRN, CNP           Data:   All laboratory data reviewed      HPI and Plan:   See dictation    Orders Placed This Encounter   Procedures     Troponin I     Erythrocyte sedimentation rate auto     CRP inflammation     Follow-Up with Cardiologist       No orders of the defined types were placed in this encounter.      There are no discontinued medications.      Encounter Diagnosis   Name Primary?     Subacute viral myocarditis Yes       CURRENT MEDICATIONS:  Current Outpatient Medications   Medication Sig Dispense Refill     acetaminophen (TYLENOL) 325 MG tablet Take 325-650 mg by mouth every 6 hours as needed       colchicine (COLCYRS) 0.6 MG tablet Take 1 tablet (0.6 mg) by mouth 2 times daily 60 tablet 0     etonogestrel-ethinyl estradiol (NUVARING) 0.12-0.015 MG/24HR vaginal ring Place 1 each vaginally every 28 days 3 each 3     ondansetron (ZOFRAN) 4 MG tablet Take 2 tablets (8 mg) by mouth every 6 hours as needed for nausea 10 tablet 0     SUMAtriptan (IMITREX) 20 MG/ACT nasal spray Spray 1 spray in nostril as needed for migraine May repeat in 2 hours. Max 2 sprays/24 hours. 6 each 5     SUMAtriptan (IMITREX) 50 MG tablet Take 1 tablet (50 mg) by mouth at onset of headache for migraine May repeat in 2 hours. Max 4 tablets/24 hours. 16 tablet 3       ALLERGIES     Allergies   Allergen Reactions     Cats        PAST MEDICAL HISTORY:  No past medical history on file.    PAST SURGICAL HISTORY:  No past surgical history on file.    FAMILY HISTORY:  Family  History   Problem Relation Age of Onset     Cancer Maternal Grandfather         leukemia       SOCIAL HISTORY:  Social History     Socioeconomic History     Marital status: Single     Spouse name: None     Number of children: None     Years of education: None     Highest education level: None   Occupational History     None   Social Needs     Financial resource strain: None     Food insecurity     Worry: None     Inability: None     Transportation needs     Medical: None     Non-medical: None   Tobacco Use     Smoking status: Never Smoker     Smokeless tobacco: Never Used   Substance and Sexual Activity     Alcohol use: No     Drug use: No     Sexual activity: Never   Lifestyle     Physical activity     Days per week: None     Minutes per session: None     Stress: None   Relationships     Social connections     Talks on phone: None     Gets together: None     Attends [employer] service: None     Active member of club or organization: None     Attends meetings of clubs or organizations: None     Relationship status: None     Intimate partner violence     Fear of current or ex partner: None     Emotionally abused: None     Physically abused: None     Forced sexual activity: None   Other Topics Concern     None   Social History Narrative     None       Review of Systems:  Skin:  Negative       Eyes:  Negative      ENT:  Negative      Respiratory:  Negative       Cardiovascular:  Negative      Gastroenterology: Negative      Genitourinary:  Negative      Musculoskeletal:  Negative      Neurologic:  Negative      Psychiatric:  Negative      Heme/Lymph/Imm:  Negative      Endocrine:  Negative        Physical Exam:  Vitals: There were no vitals taken for this visit.    General Appearance:  no distress, normal body habitus, upright.  ENT/Mouth:  membranes moist, no nasal discharge or bleeding gums. Normal head shape, no apparent injury or laceration.  Eyes:  no scleral icterus, normal conjunctivae.  No observed  jaundice.  Neck:  no apparent thyromegaly.   Chest/Lungs:  No breathing difficulty while speaking.  No audible wheezing.  Equal chest wall expansion.  No cough.  Cardiovascular:  No obviously elevated jugular venous pressure. No apparent pitting edema bilaterally  Abdomen:  no evidence of abdominal distention.   Extremities:  no cyanosis noted.  Skin:  no xanthelasma, normal skin color. No facial lacerations or other trauma.  Neurologic:  Normal arm motion bilateral, no tremors.  No apparent focal defect.  Psychiatric:  Alert and oriented x3, calm demeanor      Constitutional:           Skin:             Head:           Eyes:           Lymph:      ENT:           Neck:           Respiratory:            Cardiac:                                                           GI:           Extremities and Muscular Skeletal:                 Neurological:           Psych:           CC  Jessica Storm, APRN CNP  [facility]  [id number]  [place] [id number]

## 2020-11-05 NOTE — LETTER
"11/5/2020    Nupur Sanchez, DEWAYNE CNP  [address] [place] [id number]    RE: Ricardo Antunez       Dear Colleague,    I had the pleasure of seeing Ricardo Antunez in the [facility].    Ricardo Antunez is a 19 year old female who is being evaluated via a billable video visit.      The patient has been notified of following:     \"This video visit will be conducted via a call between you and your physician/provider. We have found that certain health care needs can be provided without the need for an in-person physical exam.  This service lets us provide the care you need with a video conversation.  If a prescription is necessary we can send it directly to your pharmacy.  If lab work is needed we can place an order for that and you can then stop by our lab to have the test done at a later time.    Video visits are billed at different rates depending on your insurance coverage.  Please reach out to your insurance provider with any questions.    If during the course of the call the physician/provider feels a video visit is not appropriate, you will not be charged for this service.\"    Patient has given verbal consent for Video visit? Yes  How would you like to obtain your AVS? Mail a copy  If you are dropped from the video visit, the video invite should be resent to: Send to e-mail at: [email or web address]  Will anyone else be joining your video visit? No        Video-Visit Details    Type of service:  Video Visit    Video Start Time: 3:38 PM  Video End Time: 3:54 PM    Originating Location (pt. Location): Other Car    Distant Location (provider location):  [facility]     Platform used for Video Visit: DEWAYNE Bullard CNP        Cardiology Clinic Progress Note  Ricardo Antunez MRN# [id number]   YOB: 2000 Age: 19 year old      Primary Cardiologist:   Dr. Burr          History of Presenting Illness:  "     Ricardo Antunez is a pleasant 19 year old patient with a past cardiac history significant for   1. Myopericarditis     Patient was hospitalized on 10/28/2020 with chest pain and mild upper respiratory symptoms.  She reported a Covid exposure but during hospitalization tested negative x2 for Covid.  Troponin was slightly elevated she was felt to likely have myopericarditis.  ESR and CRP were WNL.  Echocardiogram showed EF 60-65%, no WMA's, no significant valvular disease, and small pericardial effusion.  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  They recommended follow-up cardiac MRI.    Pt presents today for hospital follow-up. Cardiac MRI 11/3/2020 showing normal biventricular function with trivial pericardial effusion and delayed enhancement was negative for infiltrative disease or scar.  These results were reviewed with her today.    She denies any side effects on colchicine.  She has not had to refill this yet so is unsure what the cost is.  If this is too expensive, she will let us know.  She continues with some mild chest discomfort which has improved but not resolved since hospitalization.  She is aware that she should not do any strenuous activity or play volleyball for 6 months.  We did discuss that 3 to 4 weeks after her chest pain resolved she could do some light activity, which I discussed with Dr. Goodwin today. Patient reports no shortness of breath, PND, orthopnea, presyncope, syncope, edema, heart racing, or palpitations.      Current Cardiac Medications   Colchicine 0.6 mg twice daily                   Assessment and Plan:       Plan  Continue colchicine for total of 3 months    Recommendations:  1.  Call if chest pain is getting worse   2.  No strenuous exercise or activity for 6 months    Follow-up:  1. Call to schedule nonfasting lab (troponin, ESR, CRP) - we will call with results   2. See  cardiologist for follow up at [facility]: Feb 2021.      1. Acute myopericarditis    Mildly elevated troponin during hospitalization 0.066    Cardiac MRI negative for infiltrative disease or scar    Small pericardial effusion during hospitalization improved to trivial on follow-up MRI    chest pain improving but not resolved     Continue colchicine x3 months    No strenuous exercise or activity x6 months         Thank you for allowing me to participate in this delightful patient's care.      This note was completed in part using Dragon voice recognition software. Although reviewed after completion, some word and grammatical errors may occur.    Elise Ca, APRN CNP, APRN, CNP           Data:   All laboratory data reviewed      HPI and Plan:   See dictation    Orders Placed This Encounter   Procedures     Troponin I     Erythrocyte sedimentation rate auto     CRP inflammation     Follow-Up with Cardiologist       No orders of the defined types were placed in this encounter.      There are no discontinued medications.      Encounter Diagnosis   Name Primary?     Subacute viral myocarditis Yes       CURRENT MEDICATIONS:  Current Outpatient Medications   Medication Sig Dispense Refill     acetaminophen (TYLENOL) 325 MG tablet Take 325-650 mg by mouth every 6 hours as needed       colchicine (COLCYRS) 0.6 MG tablet Take 1 tablet (0.6 mg) by mouth 2 times daily 60 tablet 0     etonogestrel-ethinyl estradiol (NUVARING) 0.12-0.015 MG/24HR vaginal ring Place 1 each vaginally every 28 days 3 each 3     ondansetron (ZOFRAN) 4 MG tablet Take 2 tablets (8 mg) by mouth every 6 hours as needed for nausea 10 tablet 0     SUMAtriptan (IMITREX) 20 MG/ACT nasal spray Spray 1 spray in nostril as needed for migraine May repeat in 2 hours. Max 2 sprays/24 hours. 6 each 5     SUMAtriptan (IMITREX) 50 MG tablet Take 1 tablet (50 mg) by mouth at onset of headache for migraine May repeat in 2 hours. Max 4 tablets/24  hours. 16 tablet 3       ALLERGIES     Allergies   Allergen Reactions     Cats        PAST MEDICAL HISTORY:  No past medical history on file.    PAST SURGICAL HISTORY:  No past surgical history on file.    FAMILY HISTORY:  Family History   Problem Relation Age of Onset     Cancer Maternal Grandfather         leukemia       SOCIAL HISTORY:  Social History     Socioeconomic History     Marital status: Single     Spouse name: None     Number of children: None     Years of education: None     Highest education level: None   Occupational History     None   Social Needs     Financial resource strain: None     Food insecurity     Worry: None     Inability: None     Transportation needs     Medical: None     Non-medical: None   Tobacco Use     Smoking status: Never Smoker     Smokeless tobacco: Never Used   Substance and Sexual Activity     Alcohol use: No     Drug use: No     Sexual activity: Never   Lifestyle     Physical activity     Days per week: None     Minutes per session: None     Stress: None   Relationships     Social connections     Talks on phone: None     Gets together: None     Attends Taoist service: None     Active member of club or organization: None     Attends meetings of clubs or organizations: None     Relationship status: None     Intimate partner violence     Fear of current or ex partner: None     Emotionally abused: None     Physically abused: None     Forced sexual activity: None   Other Topics Concern     None   Social History Narrative     None       Review of Systems:  Skin:  Negative       Eyes:  Negative      ENT:  Negative      Respiratory:  Negative       Cardiovascular:  Negative      Gastroenterology: Negative      Genitourinary:  Negative      Musculoskeletal:  Negative      Neurologic:  Negative      Psychiatric:  Negative      Heme/Lymph/Imm:  Negative      Endocrine:  Negative        Physical Exam:  Vitals: There were no vitals taken for this visit.    General Appearance:  no  distress, normal body habitus, upright.  ENT/Mouth:  membranes moist, no nasal discharge or bleeding gums. Normal head shape, no apparent injury or laceration.  Eyes:  no scleral icterus, normal conjunctivae.  No observed jaundice.  Neck:  no apparent thyromegaly.   Chest/Lungs:  No breathing difficulty while speaking.  No audible wheezing.  Equal chest wall expansion.  No cough.  Cardiovascular:  No obviously elevated jugular venous pressure. No apparent pitting edema bilaterally  Abdomen:  no evidence of abdominal distention.   Extremities:  no cyanosis noted.  Skin:  no xanthelasma, normal skin color. No facial lacerations or other trauma.  Neurologic:  Normal arm motion bilateral, no tremors.  No apparent focal defect.  Psychiatric:  Alert and oriented x3, calm demeanor      Constitutional:           Skin:             Head:           Eyes:           Lymph:      ENT:           Neck:           Respiratory:            Cardiac:                                                           GI:           Extremities and Muscular Skeletal:                 Neurological:           Psych:             Thank you for allowing me to participate in the care of your patient.    Sincerely,     DEWAYNE Nava [facility]

## 2020-11-05 NOTE — PATIENT INSTRUCTIONS
Medication Changes:  Continue colchicine for total of 3 months- let us know if too expensive when you refill it     Recommendations:  1.  Call if chest pain is getting worse   2. 3-4 weeks after chest pain is gone you can do light exercise    3. No strenuous exercise or activity for 6 months (volleyball)    Follow-up:  1. Call to schedule nonfasting lab (troponin, ESR, CRP) - we will call with results   2. See cardiologist for follow up at [facility]: Feb 2021. Call in Dec. to schedule.     Cardiology Scheduling~[phone]  Cardiology Clinic RNs~[phone] (Estrellita Scott RN and Rajwinder Foreman RN)

## 2020-11-16 ENCOUNTER — HEALTH MAINTENANCE LETTER (OUTPATIENT)
Age: 20
End: 2020-11-16

## 2020-11-30 ENCOUNTER — MYC MEDICAL ADVICE (OUTPATIENT)
Dept: CARDIOLOGY | Facility: CLINIC | Age: 20
End: 2020-11-30

## 2020-11-30 DIAGNOSIS — I40.0 SUBACUTE VIRAL MYOCARDITIS: ICD-10-CM

## 2020-11-30 RX ORDER — COLCHICINE 0.6 MG/1
0.6 TABLET ORAL 2 TIMES DAILY
Qty: 60 TABLET | Refills: 1 | Status: SHIPPED | OUTPATIENT
Start: 2020-11-30 | End: 2021-02-09

## 2020-12-01 ENCOUNTER — TELEPHONE (OUTPATIENT)
Dept: CARDIOLOGY | Facility: CLINIC | Age: 20
End: 2020-12-01

## 2020-12-01 NOTE — TELEPHONE ENCOUNTER
Central Prior Authorization Team   Phone: [phone]      PA Initiation    Medication: colchicine (COLCYRS) 0.6 MG tablet  Insurance Company: [employer] - Phone [phone] Fax [phone]  Pharmacy Filling the Rx: [facility] [address] [place] [address]  Filling Pharmacy Phone: [phone]  Filling Pharmacy Fax:    Start Date: 12/1/2020

## 2020-12-01 NOTE — TELEPHONE ENCOUNTER
Central Prior Authorization Team   Phone: [phone]      PA Initiation (on [employer]'s portal as they do not participate with Cover My Meds)    Medication: colchicine (COLCYRS) 0.6 MG tablet  Insurance Company: [employer] - Phone [phone] Fax [phone]  Pharmacy Filling the Rx: [facility] [address] [place] [address] [address] [facility]  Filling Pharmacy Phone: [phone]  Filling Pharmacy Fax:    Start Date: 12/1/2020

## 2020-12-01 NOTE — TELEPHONE ENCOUNTER
Need PA for colchicine  [employer]/login  Key: [id number]  Last name: Guptill  : [birth year]  Needs to be on for a total of 3 months, and is one month done. Rajwinder Foreman RN Cardiology 2020, 8:35 AM

## 2020-12-02 NOTE — TELEPHONE ENCOUNTER
Approval -  Override placed for patient to fill the additional 2mos supply (not clear if all at once, or if has to be 1mo fill at a time)      Authorization Effective Date:  11/30/2020  Authorization Expiration Date:  Override, not true P/A    Medication: colchicine (COLCYRS) 0.6 MG tablet  Approved Dose/Quantity:  2 additional month supply  Reference #:  Tracking Number is [id number]   Insurance Company: [employer] - Phone [phone] Fax [phone]  Expected CoPay:        Which Pharmacy is filling the prescription (Not needed for infusion/clinic administered): [facility] [place], [place] - [address]   Pharmacy Notified: Yes - via voicemail, asked pharmacy to call me back directly if not able to get rx processed.  Patient Notified:

## 2020-12-22 ENCOUNTER — OFFICE VISIT (OUTPATIENT)
Dept: FAMILY MEDICINE | Facility: CLINIC | Age: 20
End: 2020-12-22
Payer: COMMERCIAL

## 2020-12-22 VITALS
BODY MASS INDEX: 25.68 KG/M2 | RESPIRATION RATE: 14 BRPM | HEART RATE: 100 BPM | WEIGHT: 200 LBS | OXYGEN SATURATION: 99 % | TEMPERATURE: 97.3 F | SYSTOLIC BLOOD PRESSURE: 120 MMHG | DIASTOLIC BLOOD PRESSURE: 62 MMHG

## 2020-12-22 DIAGNOSIS — I40.0 SUBACUTE VIRAL MYOCARDITIS: ICD-10-CM

## 2020-12-22 DIAGNOSIS — B07.0 PLANTAR WARTS: ICD-10-CM

## 2020-12-22 DIAGNOSIS — B07.8 COMMON WART: ICD-10-CM

## 2020-12-22 DIAGNOSIS — I40.0 ACUTE INFECTIVE MYOCARDITIS, DUE TO UNSPECIFIED ORGANISM: Primary | ICD-10-CM

## 2020-12-22 LAB
CRP SERPL-MCNC: 3.3 MG/L (ref 0–8)
ERYTHROCYTE [SEDIMENTATION RATE] IN BLOOD BY WESTERGREN METHOD: 6 MM/H (ref 0–20)
TROPONIN I SERPL-MCNC: 0.06 UG/L (ref 0–0.04)

## 2020-12-22 PROCEDURE — 99214 OFFICE O/P EST MOD 30 MIN: CPT | Mod: 25 | Performed by: NURSE PRACTITIONER

## 2020-12-22 PROCEDURE — 17110 DESTRUCTION B9 LES UP TO 14: CPT | Performed by: NURSE PRACTITIONER

## 2020-12-22 PROCEDURE — 85652 RBC SED RATE AUTOMATED: CPT | Performed by: NURSE PRACTITIONER

## 2020-12-22 PROCEDURE — 84484 ASSAY OF TROPONIN QUANT: CPT | Performed by: NURSE PRACTITIONER

## 2020-12-22 PROCEDURE — 36415 COLL VENOUS BLD VENIPUNCTURE: CPT | Performed by: NURSE PRACTITIONER

## 2020-12-22 PROCEDURE — 86140 C-REACTIVE PROTEIN: CPT | Performed by: NURSE PRACTITIONER

## 2020-12-22 PROCEDURE — 86769 SARS-COV-2 COVID-19 ANTIBODY: CPT | Performed by: NURSE PRACTITIONER

## 2020-12-22 ASSESSMENT — PAIN SCALES - GENERAL: PAINLEVEL: NO PAIN (0)

## 2020-12-22 NOTE — PATIENT INSTRUCTIONS
Patient Education     Understanding Plantar Warts    A plantar wart is a small, noncancerous growth on the bottom of the foot. Plantar warts often develop where friction or pressure occurs, such as on the ball of the foot. The word plantar refers to the sole of the foot. Similar warts can occur on other areas of the body such as the hands. Plantar warts are more common in children and young adults.  What causes a plantar wart?  Plantar warts are caused by a virus called human papillomavirus (HPV).  They can be spread by person-to-person contact. Or you can develop one if you walk barefoot on moist surfaces infected with the virus. This might be in a community pool area or locker room. Wearing correct footwear in such places can prevent them.  What are the symptoms of plantar warts?  Plantar warts cause a thick, rough, and often raised patch of skin on the bottom of the foot. The wart may have black dots on it. These dots are dried blood. The wart may cause pain or discomfort. You may also have trouble walking because of the pain.  How are plantar warts treated?  Many plantar warts go away without any treatment. But for those that are painful or that don t go away, several treatments are available. These include:    Salicylic acid. This treatment is put directly on the wart. It may come in the form of a liquid, ointment, pad, or patch. It is available over the counter. Don't use salicylic acid treatment for more than 12 weeks without talking with your healthcare provider.    Cryotherapy. Your healthcare provider puts liquid nitrogen on the wart with a cotton swab or spray. This treatment might be painful.    Medicine. A variety of medicines can be put on or injected into the wart. But research is mixed on how well they work.  There are many folk remedies for plantar warts, but some of these are unproven and can be dangerous. Talk with your healthcare provider about safe methods to try. Often your healthcare  provider will cut away dead parts of the wart before using other treatments.    When should I call my healthcare provider?  Call your healthcare provider if you have plantar warts that become too painful and don't go away on their own or with over-the-counter and at-home treatments.  Parag last reviewed this educational content on 8/1/2018 2000-2020 [employer]. [address] [place] [address], [place] [address]. All rights reserved. This information is not intended as a substitute for professional medical care. Always follow your healthcare professional's instructions.

## 2020-12-22 NOTE — PROGRESS NOTES
Subjective     Ricardo Antunez is a 20 year old female who presents to clinic today for the following health issues:    HPI         Warts  Onset/Duration: 1 years   Description (location/number): left little toe and right ring finger   Accompanying signs and symptoms (pain, redness): YES  History: prior warts: YES  Therapies tried and outcome: OTC meds    Myocarditis taking colchicine. Thought to be possible COVID induced but negative PCR testing.  Desires antibody testing today. Due for labs for cardiology team today.   Fatigue with activity . Limited PA at this time  Has follow up with Cardiology in February.             Review of Systems   Constitutional, HEENT, cardiovascular, pulmonary, GI, , musculoskeletal, neuro, skin, endocrine and psych systems are negative, except as otherwise noted.      Objective    /62   Pulse 100   Temp 97.3  F (36.3  C) (Tympanic)   Resp 14   Wt 90.7 kg (200 lb)   LMP 12/01/2020   SpO2 99%   BMI 25.68 kg/m    Body mass index is 25.68 kg/m .  Physical Exam   GENERAL: alert, no distress, pale and fatigued  EYES: Eyes grossly normal to inspection, PERRL and conjunctivae and sclerae normal  HENT: ear canals and TM's normal, nose and mouth without ulcers or lesions  NECK: no adenopathy, no asymmetry, masses, or scars and thyroid normal to palpation  RESP: lungs clear to auscultation - no rales, rhonchi or wheezes  CV: regular rate and rhythm, normal S1 S2, no S3 or S4, no murmur, click or rub, no peripheral edema and peripheral pulses strong  ABDOMEN: soft, nontender, no hepatosplenomegaly, no masses and bowel sounds normal  MS: no gross musculoskeletal defects noted, no edema  SKIN: plantar  Wart left foot lateral edge x 1 and along the left 5th toe x 4 and under toe x 4 - Also left hand x 1 palmar surface.   NEURO: Normal strength and tone, mentation intact and speech normal  PSYCH: mentation appears normal, affect normal/bright    Results for orders placed or performed  in visit on 12/22/20   CRP inflammation     Status: None   Result Value Ref Range    CRP Inflammation 3.3 0.0 - 8.0 mg/L   Erythrocyte sedimentation rate auto     Status: None   Result Value Ref Range    Sed Rate 6 0 - 20 mm/h   Troponin I     Status: Abnormal   Result Value Ref Range    Troponin I ES 0.060 (H) 0.000 - 0.045 ug/L           Assessment & Plan     Ricardo was seen today for wart.    Diagnoses and all orders for this visit:    Acute infective myocarditis, due to unspecified organism  -     COVID-19 Virus (Coronavirus) Antibody & Titer Reflex; Future  -     COVID-19 Virus (Coronavirus) Antibody & Titer Reflex    Subacute viral myocarditis  -     CRP inflammation  -     Erythrocyte sedimentation rate auto  -     Troponin I    Plantar warts    Common wart     AFTER VERBAL CONSENT OBTAINED. LESIONS WERE CLEANSED WELL WITH ALCOHOL All lesions are frozen with LN2 x3. b & b APPLIED. Patient tolerated procedure well.     WART CARE DISCUSSED. USE OF OTC PRODUCT STARTING IN FEW DAYS. GENTLE ABRAISION WITH PUMICE STONE OR EMERY BOARD WITH GOOD HANDWASHING AFTER. RETURN IN TWO WEEKS FOR REFREEZING UNTIL RESOLVED.      FOLLOW UP WITH CARDIOLOGY  Call or return to the clinic with any worsening of symptoms or no resolution. Patient/Parent verbalized understanding and is in agreement. Medication side effects reviewed.   Current Outpatient Medications   Medication Sig Dispense Refill     colchicine (COLCYRS) 0.6 MG tablet Take 1 tablet (0.6 mg) by mouth 2 times daily X 2 more months 60 tablet 1     etonogestrel-ethinyl estradiol (NUVARING) 0.12-0.015 MG/24HR vaginal ring Place 1 each vaginally every 28 days 3 each 3     ondansetron (ZOFRAN) 4 MG tablet Take 2 tablets (8 mg) by mouth every 6 hours as needed for nausea 10 tablet 0     SUMAtriptan (IMITREX) 20 MG/ACT nasal spray Spray 1 spray in nostril as needed for migraine May repeat in 2 hours. Max 2 sprays/24 hours. 6 each 5     SUMAtriptan (IMITREX) 50 MG tablet Take 1  tablet (50 mg) by mouth at onset of headache for migraine May repeat in 2 hours. Max 4 tablets/24 hours. 16 tablet 3     acetaminophen (TYLENOL) 325 MG tablet Take 325-650 mg by mouth every 6 hours as needed          DEWAYNE Camacho CNP  [facility]

## 2020-12-23 LAB
COVID-19 SPIKE RBD ABY TITER: NORMAL
COVID-19 SPIKE RBD ABY: NEGATIVE

## 2021-02-05 NOTE — PROGRESS NOTES
CARDIOLOGY VISIT    REASON FOR VISIT: pericarditis    SUBJECTIVE:  20-year-old female seen for follow-up of pericarditis.      October 2020 she was admitted with chest pain and right upper respiratory symptoms.  She had Covid exposure, but tested negative x2.  Echo showed EF 60%, no valve disease, small pericardial effusion.  She was treated with 3 months of colchicine.    Cardiac MRI November 2020 showed EF 60%, trivial pericardial effusion, no cardiac enhancement.  CRP and sed rate were normal in December.  Serial troponins have been 0.06.    She has been off her colchicine for 1 week.  Generally she has felt quite good recently.  She had a few intermittent episodes in the past week of a lower bilateral rib pain and a lower midsternal ache.  This will last for about 1 hour, there is no change with exertion or change with any position or deep breathing.  She otherwise feels quite good.    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  She has avoided competitive athletics per instructions for possible pericarditis for 6 months.    MEDICATIONS:  Current Outpatient Medications   Medication     acetaminophen (TYLENOL) 325 MG tablet     colchicine (COLCYRS) 0.6 MG tablet     etonogestrel-ethinyl estradiol (NUVARING) 0.12-0.015 MG/24HR vaginal ring     ondansetron (ZOFRAN) 4 MG tablet     SUMAtriptan (IMITREX) 20 MG/ACT nasal spray     SUMAtriptan (IMITREX) 50 MG tablet     No current facility-administered medications for this visit.        ALLERGIES:  Allergies   Allergen Reactions     Cats        REVIEW OF SYSTEMS:  Constitutional:  No weight loss, fever, chills, weakness or fatigue.  HEENT:  Eyes:  No visual loss, blurred vision, double vision or yellow sclerae. No hearing loss, sneezing, congestion, runny nose or sore throat.  Skin:  No rash or itching.  Cardiovascular: per HPI  Respiratory: per HPI  GI:  No anorexia, nausea, vomiting or diarrhea. No abdominal pain or blood.  :  No dysurea,  hematuria  Neurologic:  No headache, dizziness, syncope, paralysis, ataxia, numbness or tingling in the extremities. No change in bowel or bladder control.  Musculoskeletal:  No muscle, back pain, joint pain or stiffness.  Hematologic:  No anemia, bleeding or bruising.  Lymphatics:  No enlarged nodes. No history of splenectomy.  Psychiatric:  No history of depression or anxiety.  Endocrine:  No reports of sweating, cold or heat intolerance. No polyuria or polydipsia.  Allergies:  No history of asthma, hives, eczema or rhinitis.    PHYSICAL EXAM:  /75 (BP Location: Right arm, Patient Position: Sitting, Cuff Size: Adult Regular)   Pulse 79   Temp 99.1  F (37.3  C) (Tympanic)   Wt 92.9 kg (204 lb 13.9 oz)   SpO2 97%   BMI 26.30 kg/m      Constitutional: awake, alert, no distress  Eyes: PERRL, sclera nonicteric  ENT: trachea midline  Respiratory: Lungs clear  Cardiovascular: Regular rate and rhythm, no murmurs  GI: nondistended, nontender, bowel sounds present  Lymph/Hematologic: no lymphadenopathy  Skin: dry, no rash  Musculoskeletal: good muscle tone, strength 5/5 in upper and lower extremities  Neurologic: no focal deficits  Neuropsychiatric: appropriate affact    DATA:  Lab:   Recent Labs   Lab Test 10/29/20  0029   CHOL 214*   HDL 68   *   TRIG 149*     ASSESSMENT:  20-year-old female seen for follow-up of pericarditis.  It is hard to say if she actually had pericarditis.  Her inflammatory labs were normal in October when she was diagnosed.  She did have a small pericardial effusion.  Troponin was 0.06, but recheck months later was unchanged.  Her cardiac MRI in December was completely normal.  She now has completed 3 months of colchicine.  Her current atypical symptoms are probably not pericardial related.  Sed rate, CRP, and troponin will be rechecked.    She can go back to some light to moderate activity.  However she should probably avoid competitive sports for another few  months.    RECOMMENDATIONS:  1.  Possible pericarditis, suspect resolved  - Check CRP, sed rate, and troponin  -If recurrent symptoms in the future, repeat echo and inflammatory labs    Follow-up as needed based on symptoms.    Jose Goodwin MD  Cardiology - [facility]  Pager:  [phone]  Text Page  February 9, 2021

## 2021-02-09 ENCOUNTER — OFFICE VISIT (OUTPATIENT)
Dept: CARDIOLOGY | Facility: CLINIC | Age: 21
End: 2021-02-09
Attending: NURSE PRACTITIONER
Payer: COMMERCIAL

## 2021-02-09 VITALS
HEART RATE: 79 BPM | SYSTOLIC BLOOD PRESSURE: 125 MMHG | OXYGEN SATURATION: 97 % | DIASTOLIC BLOOD PRESSURE: 75 MMHG | WEIGHT: 204.87 LBS | TEMPERATURE: 99.1 F | BODY MASS INDEX: 26.3 KG/M2

## 2021-02-09 DIAGNOSIS — I40.0 SUBACUTE VIRAL MYOCARDITIS: ICD-10-CM

## 2021-02-09 LAB
CRP SERPL-MCNC: 5.9 MG/L (ref 0–8)
ERYTHROCYTE [SEDIMENTATION RATE] IN BLOOD BY WESTERGREN METHOD: 8 MM/H (ref 0–20)
TROPONIN I SERPL-MCNC: 0.08 UG/L (ref 0–0.04)

## 2021-02-09 PROCEDURE — 84484 ASSAY OF TROPONIN QUANT: CPT | Performed by: INTERNAL MEDICINE

## 2021-02-09 PROCEDURE — 99214 OFFICE O/P EST MOD 30 MIN: CPT | Performed by: INTERNAL MEDICINE

## 2021-02-09 PROCEDURE — 36415 COLL VENOUS BLD VENIPUNCTURE: CPT | Performed by: INTERNAL MEDICINE

## 2021-02-09 PROCEDURE — 86140 C-REACTIVE PROTEIN: CPT | Performed by: INTERNAL MEDICINE

## 2021-02-09 PROCEDURE — 85652 RBC SED RATE AUTOMATED: CPT | Performed by: INTERNAL MEDICINE

## 2021-02-09 NOTE — PATIENT INSTRUCTIONS
1. Will check inflammation labs today - CRP and ESR, also will check troponin    2. If you get symptoms again like October, then call and will repeat labs and see you back

## 2021-02-09 NOTE — RESULT ENCOUNTER NOTE
Troponin slightly higher than previous. Per Dr Goodwin--nothing to be worried about, just undulation of her chronically elevated troponin. Pt notified of results via her [employer] account

## 2021-02-09 NOTE — LETTER
2/9/2021    Nupur Escobar Daniel, APRN CNP  [address] [facility] [address]    RE: Ricardo Antunez       Dear Colleague,    I had the pleasure of seeing Ricardo LISA Antunez in the [facility].    CARDIOLOGY VISIT    REASON FOR VISIT: pericarditis    SUBJECTIVE:  20-year-old female seen for follow-up of pericarditis.      October 2020 she was admitted with chest pain and right upper respiratory symptoms.  She had Covid exposure, but tested negative x2.  Echo showed EF 60%, no valve disease, small pericardial effusion.  She was treated with 3 months of colchicine.    Cardiac MRI November 2020 showed EF 60%, trivial pericardial effusion, no cardiac enhancement.  CRP and sed rate were normal in December.  Serial troponins have been 0.06.    She has been off her colchicine for 1 week.  Generally she has felt quite good recently.  She had a few intermittent episodes in the past week of a lower bilateral rib pain and a lower midsternal ache.  This will last for about 1 hour, there is no change with exertion or change with any position or deep breathing.  She otherwise feels quite good.    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  She has avoided competitive athletics per instructions for possible pericarditis for 6 months.    MEDICATIONS:  Current Outpatient Medications   Medication     acetaminophen (TYLENOL) 325 MG tablet     colchicine (COLCYRS) 0.6 MG tablet     etonogestrel-ethinyl estradiol (NUVARING) 0.12-0.015 MG/24HR vaginal ring     ondansetron (ZOFRAN) 4 MG tablet     SUMAtriptan (IMITREX) 20 MG/ACT nasal spray     SUMAtriptan (IMITREX) 50 MG tablet     No current facility-administered medications for this visit.        ALLERGIES:  Allergies   Allergen Reactions     Cats        REVIEW OF SYSTEMS:  Constitutional:  No weight loss, fever, chills, weakness or fatigue.  HEENT:  Eyes:  No visual loss, blurred vision, double vision or yellow  sclerae. No hearing loss, sneezing, congestion, runny nose or sore throat.  Skin:  No rash or itching.  Cardiovascular: per HPI  Respiratory: per HPI  GI:  No anorexia, nausea, vomiting or diarrhea. No abdominal pain or blood.  :  No dysurea, hematuria  Neurologic:  No headache, dizziness, syncope, paralysis, ataxia, numbness or tingling in the extremities. No change in bowel or bladder control.  Musculoskeletal:  No muscle, back pain, joint pain or stiffness.  Hematologic:  No anemia, bleeding or bruising.  Lymphatics:  No enlarged nodes. No history of splenectomy.  Psychiatric:  No history of depression or anxiety.  Endocrine:  No reports of sweating, cold or heat intolerance. No polyuria or polydipsia.  Allergies:  No history of asthma, hives, eczema or rhinitis.    PHYSICAL EXAM:  /75 (BP Location: Right arm, Patient Position: Sitting, Cuff Size: Adult Regular)   Pulse 79   Temp 99.1  F (37.3  C) (Tympanic)   Wt 92.9 kg (204 lb 13.9 oz)   SpO2 97%   BMI 26.30 kg/m      Constitutional: awake, alert, no distress  Eyes: PERRL, sclera nonicteric  ENT: trachea midline  Respiratory: Lungs clear  Cardiovascular: Regular rate and rhythm, no murmurs  GI: nondistended, nontender, bowel sounds present  Lymph/Hematologic: no lymphadenopathy  Skin: dry, no rash  Musculoskeletal: good muscle tone, strength 5/5 in upper and lower extremities  Neurologic: no focal deficits  Neuropsychiatric: appropriate affact    DATA:  Lab:   Recent Labs   Lab Test 10/29/20  0029   CHOL 214*   HDL 68   *   TRIG 149*     ASSESSMENT:  20-year-old female seen for follow-up of pericarditis.  It is hard to say if she actually had pericarditis.  Her inflammatory labs were normal in October when she was diagnosed.  She did have a small pericardial effusion.  Troponin was 0.06, but recheck months later was unchanged.  Her cardiac MRI in December was completely normal.  She now has completed 3 months of colchicine.  Her current  atypical symptoms are probably not pericardial related.  Sed rate, CRP, and troponin will be rechecked.    She can go back to some light to moderate activity.  However she should probably avoid competitive sports for another few months.    RECOMMENDATIONS:  1.  Possible pericarditis, suspect resolved  - Check CRP, sed rate, and troponin  -If recurrent symptoms in the future, repeat echo and inflammatory labs    Follow-up as needed based on symptoms.    Jose Goodwin MD  Cardiology - [facility]  Pager:  [phone]  Text Page  February 9, 2021      Thank you for allowing me to participate in the care of your patient.    Sincerely,     Jose Goodwin MD     [facility]

## 2021-09-18 ENCOUNTER — HEALTH MAINTENANCE LETTER (OUTPATIENT)
Age: 21
End: 2021-09-18

## 2022-01-08 ENCOUNTER — HEALTH MAINTENANCE LETTER (OUTPATIENT)
Age: 22
End: 2022-01-08

## 2022-02-03 ENCOUNTER — VIRTUAL VISIT (OUTPATIENT)
Dept: FAMILY MEDICINE | Facility: CLINIC | Age: 22
End: 2022-02-03
Payer: COMMERCIAL

## 2022-02-03 DIAGNOSIS — J20.9 ACUTE BRONCHITIS WITH SYMPTOMS > 10 DAYS: Primary | ICD-10-CM

## 2022-02-03 PROCEDURE — 99214 OFFICE O/P EST MOD 30 MIN: CPT | Mod: 95 | Performed by: NURSE PRACTITIONER

## 2022-02-03 NOTE — PROGRESS NOTES
Ricardo is a 21 year old who is being evaluated via a billable video visit.      How would you like to obtain your AVS? MyChart  If the video visit is dropped, the invitation should be resent by: Text to cell phone: [phone]  Will anyone else be joining your video visit? No    Video Start Time: 438    Assessment & Plan     Acute bronchitis with symptoms > 10 days  Begin new medication today  - amoxicillin-clavulanate (AUGMENTIN) 875-125 MG tablet  Dispense: 20 tablet; Refill: 0  Increase fluids   Rest.  Ok to use nyquil and dayquil for symptom relief    Call or return to the clinic with any worsening of symptoms or no resolution. Patient/Parent verbalized understanding and is in agreement. Medication side effects reviewed.   Current Outpatient Medications   Medication Sig Dispense Refill     amoxicillin-clavulanate (AUGMENTIN) 875-125 MG tablet Take 1 tablet by mouth 2 times daily 20 tablet 0     acetaminophen (TYLENOL) 325 MG tablet Take 325-650 mg by mouth every 6 hours as needed       etonogestrel-ethinyl estradiol (NUVARING) 0.12-0.015 MG/24HR vaginal ring Place 1 each vaginally every 28 days 3 each 3     ondansetron (ZOFRAN) 4 MG tablet Take 2 tablets (8 mg) by mouth every 6 hours as needed for nausea 10 tablet 0     SUMAtriptan (IMITREX) 20 MG/ACT nasal spray Spray 1 spray in nostril as needed for migraine May repeat in 2 hours. Max 2 sprays/24 hours. 6 each 5     SUMAtriptan (IMITREX) 50 MG tablet Take 1 tablet (50 mg) by mouth at onset of headache for migraine May repeat in 2 hours. Max 4 tablets/24 hours. 16 tablet 3     As the provider for this telephone/video service, I attest that I introduced myself to the patient, provided my credentials, disclosed my location, and determined that, based on a review of the patient's chart and/or a discussion with members of the patient's treatment team, a telephone/video visit is an appropriate and effective means of providing this service. The patient and I mutually  agree that this visit is appropriate for telephone/video visit as well.            DEWAYNE Camacho CNP  [facility]    Jeyson Silva is a 21 year old who presents for the following health issues     HPI     Acute Illness  3 covid test all negative rapid x 2 nasal PCR   Started Thursday last week  Acute illness concerns: Fatigue, body aches, congestion   Onset/Duration: 01/27/2022  Symptoms:  Fever: no  Chills/Sweats: YES  Headache (location?): YES  Sinus Pressure: YES  Conjunctivitis:  no  Ear Pain: no  Rhinorrhea: YES  Congestion: YES  Sore Throat: no  Cough: no  Fatigue/Achiness: YES  Sick/Strep Exposure: no  nyquil and dayquil  Myocarditis no recent follow up yet  No chest pain, shortness of breath or edema    History of   Elevated antinuclear antibody (ABIGAIL) level (Primary Dx);   Myocarditis, unspecified chronicity,   Previously seen by cardiology seen by rheumatology  Symptoms resolved.   No further follow up recommended.       Review of Systems   Constitutional, HEENT, cardiovascular, pulmonary, GI, , musculoskeletal, neuro, skin, endocrine and psych systems are negative, except as otherwise noted.      Objective           Vitals:  No vitals were obtained today due to virtual visit.    Physical Exam   GENERAL: Healthy, alert and no distress  EYES: Eyes grossly normal to inspection.  No discharge or erythema, or obvious scleral/conjunctival abnormalities.  RESP: + wet cough   SKIN: Visible skin clear. No significant rash, abnormal pigmentation or lesions.  NEURO: Cranial nerves grossly intact.  Mentation and speech appropriate for age.  PSYCH: Mentation appears normal, affect normal/bright, judgement and insight intact, normal speech and appearance well-groomed.    Orders Only on 02/09/2021   Component Date Value Ref Range Status     Sed Rate 02/09/2021 8  0 - 20 mm/h Final     CRP Inflammation 02/09/2021 5.9  0.0 - 8.0 mg/L Final     Troponin I ES 02/09/2021 0.084*  0.000 - 0.045 ug/L Final    Comment: The 99th percentile for upper reference range is 0.045 ug/L.  Troponin values   in the range of 0.045 - 0.120 ug/L may be associated with risks of adverse   clinical events.                   Video-Visit Details    Type of service:  Video Visit    Video End Time:440    Originating Location (pt. Location): Home    Distant Location (provider location):  [facility]     Platform used for Video Visit: [employer]oke[employer]

## 2022-03-03 ENCOUNTER — TELEPHONE (OUTPATIENT)
Dept: FAMILY MEDICINE | Facility: CLINIC | Age: 22
End: 2022-03-03
Payer: COMMERCIAL

## 2022-03-03 NOTE — TELEPHONE ENCOUNTER
Patient Quality Outreach    Patient is due for the following:   Cervical Cancer Screening - PAP Needed    NEXT STEPS:   Schedule a yearly physical    Type of outreach:    Sent [employer]ok[employer] message.      Questions for provider review:    None     Sunni Khoury, CMA

## 2022-11-20 ENCOUNTER — HEALTH MAINTENANCE LETTER (OUTPATIENT)
Age: 22
End: 2022-11-20

## 2022-12-20 ENCOUNTER — TRANSFERRED RECORDS (OUTPATIENT)
Dept: HEALTH INFORMATION MANAGEMENT | Facility: CLINIC | Age: 22
End: 2022-12-20

## 2023-01-03 ENCOUNTER — TRANSFERRED RECORDS (OUTPATIENT)
Dept: HEALTH INFORMATION MANAGEMENT | Facility: CLINIC | Age: 23
End: 2023-01-03

## 2023-02-14 ENCOUNTER — TRANSFERRED RECORDS (OUTPATIENT)
Dept: HEALTH INFORMATION MANAGEMENT | Facility: CLINIC | Age: 23
End: 2023-02-14

## 2023-04-15 ENCOUNTER — HEALTH MAINTENANCE LETTER (OUTPATIENT)
Age: 23
End: 2023-04-15

## 2024-06-16 ENCOUNTER — HEALTH MAINTENANCE LETTER (OUTPATIENT)
Age: 24
End: 2024-06-16

## 2025-04-08 ENCOUNTER — OFFICE VISIT (OUTPATIENT)
Dept: OBGYN | Facility: CLINIC | Age: 25
End: 2025-04-08
Payer: COMMERCIAL

## 2025-04-08 VITALS
TEMPERATURE: 97.9 F | DIASTOLIC BLOOD PRESSURE: 86 MMHG | BODY MASS INDEX: 30.75 KG/M2 | RESPIRATION RATE: 18 BRPM | SYSTOLIC BLOOD PRESSURE: 128 MMHG | HEART RATE: 87 BPM | WEIGHT: 227 LBS | HEIGHT: 72 IN

## 2025-04-08 DIAGNOSIS — Z01.419 WOMEN'S ANNUAL ROUTINE GYNECOLOGICAL EXAMINATION: Primary | ICD-10-CM

## 2025-04-08 DIAGNOSIS — Z12.4 SCREENING FOR CERVICAL CANCER: ICD-10-CM

## 2025-04-08 DIAGNOSIS — N89.8 VAGINAL IRRITATION: ICD-10-CM

## 2025-04-08 DIAGNOSIS — Z11.3 SCREENING FOR STD (SEXUALLY TRANSMITTED DISEASE): ICD-10-CM

## 2025-04-08 LAB
ALBUMIN UR-MCNC: NEGATIVE MG/DL
APPEARANCE UR: CLEAR
BACTERIA #/AREA URNS HPF: ABNORMAL /HPF
BILIRUB UR QL STRIP: NEGATIVE
CLUE CELLS: ABNORMAL
COLOR UR AUTO: YELLOW
GLUCOSE UR STRIP-MCNC: NEGATIVE MG/DL
HGB UR QL STRIP: NEGATIVE
KETONES UR STRIP-MCNC: NEGATIVE MG/DL
LEUKOCYTE ESTERASE UR QL STRIP: NEGATIVE
NITRATE UR QL: NEGATIVE
PH UR STRIP: 6 [PH] (ref 5–7)
RBC #/AREA URNS AUTO: ABNORMAL /HPF
SP GR UR STRIP: 1.02 (ref 1–1.03)
SQUAMOUS #/AREA URNS AUTO: ABNORMAL /LPF
TRICHOMONAS, WET PREP: ABNORMAL
UROBILINOGEN UR STRIP-ACNC: 0.2 E.U./DL
WBC #/AREA URNS AUTO: ABNORMAL /HPF
WBC'S/HIGH POWER FIELD, WET PREP: ABNORMAL
YEAST, WET PREP: ABNORMAL

## 2025-04-08 PROCEDURE — 99459 PELVIC EXAMINATION: CPT | Performed by: PHYSICIAN ASSISTANT

## 2025-04-08 PROCEDURE — 87210 SMEAR WET MOUNT SALINE/INK: CPT | Performed by: PHYSICIAN ASSISTANT

## 2025-04-08 PROCEDURE — 99213 OFFICE O/P EST LOW 20 MIN: CPT | Mod: 25 | Performed by: PHYSICIAN ASSISTANT

## 2025-04-08 PROCEDURE — 87086 URINE CULTURE/COLONY COUNT: CPT | Performed by: PHYSICIAN ASSISTANT

## 2025-04-08 PROCEDURE — 3074F SYST BP LT 130 MM HG: CPT | Performed by: PHYSICIAN ASSISTANT

## 2025-04-08 PROCEDURE — 87491 CHLMYD TRACH DNA AMP PROBE: CPT | Performed by: PHYSICIAN ASSISTANT

## 2025-04-08 PROCEDURE — 81001 URINALYSIS AUTO W/SCOPE: CPT | Performed by: PHYSICIAN ASSISTANT

## 2025-04-08 PROCEDURE — 87591 N.GONORRHOEAE DNA AMP PROB: CPT | Performed by: PHYSICIAN ASSISTANT

## 2025-04-08 PROCEDURE — 3079F DIAST BP 80-89 MM HG: CPT | Performed by: PHYSICIAN ASSISTANT

## 2025-04-08 PROCEDURE — 99385 PREV VISIT NEW AGE 18-39: CPT | Performed by: PHYSICIAN ASSISTANT

## 2025-04-08 NOTE — NURSING NOTE
"Initial /86 (BP Location: Right arm, Patient Position: Chair, Cuff Size: Adult Regular)   Pulse 87   Temp 97.9  F (36.6  C) (Tympanic)   Resp 18   Ht 1.88 m (6' 2\")   Wt 103 kg (227 lb)   LMP 03/26/2025   BMI 29.15 kg/m   Estimated body mass index is 29.15 kg/m  as calculated from the following:    Height as of this encounter: 1.88 m (6' 2\").    Weight as of this encounter: 103 kg (227 lb). .    "

## 2025-04-08 NOTE — PROGRESS NOTES
SUBJECTIVE:   CC: Ricardo Antunez is an 24 year old woman who presents for preventive health visit.       Patient has been advised of split billing requirements and indicates understanding: Yes  Healthy Habits:  Do you get at least three servings of calcium containing foods daily (dairy, green leafy vegetables, etc.)? yes  Amount of exercise or daily activities, outside of work: 6 day(s) per week  Problems taking medications regularly No  Medication side effects: No  Have you had an eye exam in the past two years? yes  Do you see a dentist twice per year? yes  Do you have sleep apnea, excessive snoring or daytime drowsiness?no    Regular periods.   Patient states she is a  and wears tight clothing. She states she noticed some slight itching. Will get wet prep and UA/UC.    She has never had a pap smear, so will collect today.   Patient has never been sexually active. No concerns for STD. Will get screening gonorrhea/chlamydia swab today.     Today's PHQ-2 Score:       4/8/2025     1:12 PM 4/8/2025    10:06 AM   PHQ-2 ( 1999 [employer])   Q1: Little interest or pleasure in doing things 0 0   Q2: Feeling down, depressed or hopeless 0 0   PHQ-2 Score 0 0    Q1: Little interest or pleasure in doing things  Not at all   Q2: Feeling down, depressed or hopeless  Not at all   PHQ-2 Score  0       Patient-reported       Abuse: Current or Past(Physical, Sexual or Emotional)- No  Do you feel safe in your environment? Yes    Have you ever done Advance Care Planning? (For example, a Health Directive, POLST, or a discussion with a medical provider or your loved ones about your wishes): No, advance care planning information given to patient to review.  Advanced care planning was discussed at today's visit.    Social History     Tobacco Use    Smoking status: Never    Smokeless tobacco: Never   Substance Use Topics    Alcohol use: No     If you drink alcohol do you typically have >3 drinks per day or >7 drinks per  week? No                     Reviewed orders with patient.  Reviewed health maintenance and updated orders accordingly - Yes  BP Readings from Last 3 Encounters:   04/08/25 128/86   02/09/21 125/75   12/22/20 120/62    Wt Readings from Last 3 Encounters:   04/08/25 103 kg (227 lb)   02/09/21 92.9 kg (204 lb 13.9 oz)   12/22/20 90.7 kg (200 lb)                  Patient Active Problem List   Diagnosis    Acute suppurative otitis media without spontaneous rupture of ear drum    Migraine with aura    Myocarditis (H)     History reviewed. No pertinent surgical history.    Social History     Tobacco Use    Smoking status: Never    Smokeless tobacco: Never   Substance Use Topics    Alcohol use: No     Family History   Problem Relation Age of Onset    Cancer Maternal Grandfather         leukemia         Current Outpatient Medications   Medication Sig Dispense Refill    acetaminophen (TYLENOL) 325 MG tablet Take 325-650 mg by mouth every 6 hours as needed      amoxicillin-clavulanate (AUGMENTIN) 875-125 MG tablet Take 1 tablet by mouth 2 times daily 20 tablet 0    etonogestrel-ethinyl estradiol (NUVARING) 0.12-0.015 MG/24HR vaginal ring Place 1 each vaginally every 28 days 3 each 3    ondansetron (ZOFRAN) 4 MG tablet Take 2 tablets (8 mg) by mouth every 6 hours as needed for nausea 10 tablet 0    SUMAtriptan (IMITREX) 20 MG/ACT nasal spray Spray 1 spray in nostril as needed for migraine May repeat in 2 hours. Max 2 sprays/24 hours. 6 each 5    SUMAtriptan (IMITREX) 50 MG tablet Take 1 tablet (50 mg) by mouth at onset of headache for migraine May repeat in 2 hours. Max 4 tablets/24 hours. 16 tablet 3     Allergies   Allergen Reactions    Cats      Recent Labs   Lab Test 10/29/20  0029 10/28/20  1706   *  --    HDL 68  --    TRIG 149*  --    CR  --  0.81   GFRESTIMATED  --  >90   GFRESTBLACK  --  >90   POTASSIUM  --  3.7   TSH  --  2.39            Pertinent mammograms are reviewed under the imaging tab.  History of  "abnormal Pap smear: No - age 21-29 PAP every 3 years recommended     Reviewed and updated as needed this visit by clinical staff   Tobacco  Allergies  Meds   Med Hx  Surg Hx  Fam Hx  Soc Hx        Reviewed and updated as needed this visit by Provider                  History reviewed. No pertinent past medical history.   History reviewed. No pertinent surgical history.  OB History    Para Term  AB Living   0 0 0 0 0 0   SAB IAB Ectopic Multiple Live Births   0 0 0 0 0       ROS:  CONSTITUTIONAL: NEGATIVE for fever, chills, change in weight  INTEGUMENTARU/SKIN: NEGATIVE for worrisome rashes, moles or lesions  EYES: NEGATIVE for vision changes or irritation  ENT: NEGATIVE for ear, mouth and throat problems  RESP: NEGATIVE for significant cough or SOB  BREAST: NEGATIVE for masses, tenderness or discharge  CV: NEGATIVE for chest pain, palpitations or peripheral edema  GI: NEGATIVE for nausea, abdominal pain, heartburn, or change in bowel habits  : NEGATIVE for unusual urinary or vaginal symptoms. Periods are regular.  MUSCULOSKELETAL: NEGATIVE for significant arthralgias or myalgia  NEURO: NEGATIVE for weakness, dizziness or paresthesias  ENDOCRINE: NEGATIVE for temperature intolerance, skin/hair changes  HEME/ALLERGY/IMMUNE: NEGATIVE for bleeding problems  PSYCHIATRIC: NEGATIVE for changes in mood or affect    OBJECTIVE:   /86 (BP Location: Right arm, Patient Position: Chair, Cuff Size: Adult Regular)   Pulse 87   Temp 97.9  F (36.6  C) (Tympanic)   Resp 18   Ht 1.88 m (6' 2\")   Wt 103 kg (227 lb)   LMP 2025   BMI 29.15 kg/m    EXAM:  GENERAL: alert and no distress  EYES: Eyes grossly normal to inspection, PERRL and conjunctivae and sclerae normal  HENT: ear canals and TM's normal, nose and mouth without ulcers or lesions  NECK: no adenopathy, no asymmetry, masses, or scars  RESP: lungs clear to auscultation - no rales, rhonchi or wheezes  BREAST: normal without masses, " "tenderness or nipple discharge and no palpable axillary masses or adenopathy  CV: regular rate and rhythm, normal S1 S2, no S3 or S4, no murmur, click or rub, no peripheral edema  ABDOMEN: soft, nontender, no hepatosplenomegaly, no masses and bowel sounds normal   (female) w/bimanual: normal female external genitalia, normal urethral meatus, normal vaginal mucosa, and normal cervix/adnexa/uterus without masses or discharge  MS: no gross musculoskeletal defects noted, no edema  SKIN: no suspicious lesions or rashes  NEURO: Normal strength and tone, mentation intact and speech normal  PSYCH: mentation appears normal, affect normal/bright      ASSESSMENT/PLAN:   (Z01.419) Women's annual routine gynecological examination  (primary encounter diagnosis)  Comment: no abnormal findings.   Plan: Pap Screen Only - Recommended Age 21 - 24         Years, Wet prep - Clinic Collect, Urine         Culture, UA with Microscopic          (Z11.3) Screening for STD (sexually transmitted disease)  Comment: screening gonorrhea/chlamydia obtained today. No concerns for STD at this time.   Plan: Wet prep - Clinic Collect, Chlamydia         trachomatis/Neisseria gonorrhoeae by PCR          (N89.8) Vaginal irritation  Comment: will rule out vaginal irritation and UTI today with wet prep and UA/UC. Will treat pending results.   Plan: Wet prep - Clinic Collect, Urine Culture, UA         with Microscopic          (Z12.4) Screening for cervical cancer  Comment: pap smear today.   Plan: Pap Screen Only - Recommended Age 21 - 24 Years          Patient has been advised of split billing requirements and indicates understanding: Yes  COUNSELING:   Reviewed preventive health counseling, as reflected in patient instructions       Regular exercise       Healthy diet/nutrition       Vision screening    Estimated body mass index is 29.15 kg/m  as calculated from the following:    Height as of this encounter: 1.88 m (6' 2\").    Weight as of this " encounter: 103 kg (227 lb).    Weight management plan: Discussed healthy diet and exercise guidelines    She reports that she has never smoked. She has never used smokeless tobacco.      Counseling Resources:  ATP IV Guidelines  Pooled Cohorts Equation Calculator  Breast Cancer Risk Calculator  BRCA-Related Cancer Risk Assessment: FHS-7 Tool  FRAX Risk Assessment  ICSI Preventive Guidelines  Dietary Guidelines for Americans, 2010  USDA's MyPlate  ASA Prophylaxis  Lung CA Screening    HERMILO Turner [facility] [place]

## 2025-04-09 LAB
BACTERIA UR CULT: NO GROWTH
C TRACH DNA SPEC QL PROBE+SIG AMP: NEGATIVE
N GONORRHOEA DNA SPEC QL NAA+PROBE: NEGATIVE
SPECIMEN TYPE: NORMAL